# Patient Record
Sex: FEMALE | Race: WHITE | NOT HISPANIC OR LATINO | URBAN - METROPOLITAN AREA
[De-identification: names, ages, dates, MRNs, and addresses within clinical notes are randomized per-mention and may not be internally consistent; named-entity substitution may affect disease eponyms.]

---

## 2017-05-04 ENCOUNTER — FOLLOW UP ESTABLISHED (OUTPATIENT)
Dept: URBAN - METROPOLITAN AREA CLINIC 44 | Facility: CLINIC | Age: 77
End: 2017-05-04
Payer: MEDICARE

## 2017-05-04 DIAGNOSIS — H25.13 AGE-RELATED NUCLEAR CATARACT, BILATERAL: ICD-10-CM

## 2017-05-04 PROCEDURE — 92134 CPTRZ OPH DX IMG PST SGM RTA: CPT | Performed by: OPTOMETRIST

## 2017-05-04 PROCEDURE — 92015 DETERMINE REFRACTIVE STATE: CPT | Performed by: OPTOMETRIST

## 2017-05-04 PROCEDURE — 92014 COMPRE OPH EXAM EST PT 1/>: CPT | Performed by: OPTOMETRIST

## 2017-05-04 ASSESSMENT — KERATOMETRY
OD: 43.50
OS: 43.38

## 2017-05-04 ASSESSMENT — INTRAOCULAR PRESSURE
OD: 17
OS: 19

## 2017-05-04 ASSESSMENT — VISUAL ACUITY
OD: 20/25
OS: 20/20

## 2018-05-07 ENCOUNTER — FOLLOW UP ESTABLISHED (OUTPATIENT)
Dept: URBAN - METROPOLITAN AREA CLINIC 44 | Facility: CLINIC | Age: 78
End: 2018-05-07
Payer: MEDICARE

## 2018-05-07 DIAGNOSIS — H35.361 DRUSEN (DEGENERATIVE) OF MACULA, RIGHT EYE: Primary | ICD-10-CM

## 2018-05-07 PROCEDURE — 92014 COMPRE OPH EXAM EST PT 1/>: CPT | Performed by: OPTOMETRIST

## 2018-05-07 PROCEDURE — 92015 DETERMINE REFRACTIVE STATE: CPT | Performed by: OPTOMETRIST

## 2018-05-07 PROCEDURE — 92134 CPTRZ OPH DX IMG PST SGM RTA: CPT | Performed by: OPTOMETRIST

## 2018-05-07 ASSESSMENT — VISUAL ACUITY
OD: 20/25
OS: 20/30

## 2018-05-07 ASSESSMENT — INTRAOCULAR PRESSURE
OS: 16
OD: 15

## 2018-05-07 ASSESSMENT — KERATOMETRY
OD: 43.38
OS: 43.38

## 2020-10-27 ENCOUNTER — FOLLOW UP ESTABLISHED (OUTPATIENT)
Dept: URBAN - METROPOLITAN AREA CLINIC 44 | Facility: CLINIC | Age: 80
End: 2020-10-27
Payer: MEDICARE

## 2020-10-27 DIAGNOSIS — H25.813 COMBINED FORMS OF AGE-RELATED CATARACT, BILATERAL: Primary | ICD-10-CM

## 2020-10-27 PROCEDURE — 92014 COMPRE OPH EXAM EST PT 1/>: CPT | Performed by: OPTOMETRIST

## 2020-10-27 PROCEDURE — 92250 FUNDUS PHOTOGRAPHY W/I&R: CPT | Performed by: OPTOMETRIST

## 2020-10-27 PROCEDURE — 92134 CPTRZ OPH DX IMG PST SGM RTA: CPT | Performed by: OPTOMETRIST

## 2020-10-27 ASSESSMENT — KERATOMETRY
OS: 43.50
OD: 43.25

## 2020-10-27 ASSESSMENT — VISUAL ACUITY
OD: 20/25
OS: 20/30

## 2020-10-27 ASSESSMENT — INTRAOCULAR PRESSURE
OD: 18
OS: 18

## 2022-11-04 ENCOUNTER — INPATIENT (INPATIENT)
Facility: HOSPITAL | Age: 82
LOS: 6 days | Discharge: HOME | DRG: 439 | End: 2022-11-11
Attending: HOSPITALIST | Admitting: HOSPITALIST
Payer: MEDICARE

## 2022-11-04 VITALS
TEMPERATURE: 97 F | SYSTOLIC BLOOD PRESSURE: 146 MMHG | OXYGEN SATURATION: 99 % | HEART RATE: 90 BPM | DIASTOLIC BLOOD PRESSURE: 61 MMHG | RESPIRATION RATE: 17 BRPM

## 2022-11-04 DIAGNOSIS — Z95.2 PRESENCE OF PROSTHETIC HEART VALVE: Chronic | ICD-10-CM

## 2022-11-04 LAB
ALBUMIN SERPL ELPH-MCNC: 4.3 G/DL — SIGNIFICANT CHANGE UP (ref 3.3–5.2)
ALP SERPL-CCNC: 232 U/L — HIGH (ref 40–120)
ALT FLD-CCNC: 578 U/L — HIGH
ANION GAP SERPL CALC-SCNC: 11 MMOL/L — SIGNIFICANT CHANGE UP (ref 5–17)
APTT BLD: 25.9 SEC — LOW (ref 27.5–35.5)
AST SERPL-CCNC: 1105 U/L — HIGH
BASOPHILS # BLD AUTO: 0.03 K/UL — SIGNIFICANT CHANGE UP (ref 0–0.2)
BASOPHILS NFR BLD AUTO: 0.3 % — SIGNIFICANT CHANGE UP (ref 0–2)
BILIRUB SERPL-MCNC: 0.6 MG/DL — SIGNIFICANT CHANGE UP (ref 0.4–2)
BLD GP AB SCN SERPL QL: SIGNIFICANT CHANGE UP
BUN SERPL-MCNC: 20.7 MG/DL — HIGH (ref 8–20)
CALCIUM SERPL-MCNC: 9.9 MG/DL — SIGNIFICANT CHANGE UP (ref 8.4–10.5)
CHLORIDE SERPL-SCNC: 98 MMOL/L — SIGNIFICANT CHANGE UP (ref 96–108)
CO2 SERPL-SCNC: 25 MMOL/L — SIGNIFICANT CHANGE UP (ref 22–29)
CREAT SERPL-MCNC: 0.82 MG/DL — SIGNIFICANT CHANGE UP (ref 0.5–1.3)
EGFR: 72 ML/MIN/1.73M2 — SIGNIFICANT CHANGE UP
EOSINOPHIL # BLD AUTO: 0.02 K/UL — SIGNIFICANT CHANGE UP (ref 0–0.5)
EOSINOPHIL NFR BLD AUTO: 0.2 % — SIGNIFICANT CHANGE UP (ref 0–6)
FLUAV AG NPH QL: SIGNIFICANT CHANGE UP
FLUBV AG NPH QL: SIGNIFICANT CHANGE UP
GLUCOSE SERPL-MCNC: 147 MG/DL — HIGH (ref 70–99)
HCT VFR BLD CALC: 38.9 % — SIGNIFICANT CHANGE UP (ref 34.5–45)
HGB BLD-MCNC: 12.8 G/DL — SIGNIFICANT CHANGE UP (ref 11.5–15.5)
IMM GRANULOCYTES NFR BLD AUTO: 0.3 % — SIGNIFICANT CHANGE UP (ref 0–0.9)
INR BLD: 1.05 RATIO — SIGNIFICANT CHANGE UP (ref 0.88–1.16)
LACTATE BLDV-MCNC: 1.4 MMOL/L — SIGNIFICANT CHANGE UP (ref 0.5–2)
LIDOCAIN IGE QN: >3000 U/L — HIGH (ref 22–51)
LYMPHOCYTES # BLD AUTO: 0.6 K/UL — LOW (ref 1–3.3)
LYMPHOCYTES # BLD AUTO: 6.7 % — LOW (ref 13–44)
MCHC RBC-ENTMCNC: 29.6 PG — SIGNIFICANT CHANGE UP (ref 27–34)
MCHC RBC-ENTMCNC: 32.9 GM/DL — SIGNIFICANT CHANGE UP (ref 32–36)
MCV RBC AUTO: 89.8 FL — SIGNIFICANT CHANGE UP (ref 80–100)
MONOCYTES # BLD AUTO: 0.22 K/UL — SIGNIFICANT CHANGE UP (ref 0–0.9)
MONOCYTES NFR BLD AUTO: 2.5 % — SIGNIFICANT CHANGE UP (ref 2–14)
NEUTROPHILS # BLD AUTO: 8.07 K/UL — HIGH (ref 1.8–7.4)
NEUTROPHILS NFR BLD AUTO: 90 % — HIGH (ref 43–77)
PLATELET # BLD AUTO: 195 K/UL — SIGNIFICANT CHANGE UP (ref 150–400)
POTASSIUM SERPL-MCNC: 4.3 MMOL/L — SIGNIFICANT CHANGE UP (ref 3.5–5.3)
POTASSIUM SERPL-SCNC: 4.3 MMOL/L — SIGNIFICANT CHANGE UP (ref 3.5–5.3)
PROT SERPL-MCNC: 6.9 G/DL — SIGNIFICANT CHANGE UP (ref 6.6–8.7)
PROTHROM AB SERPL-ACNC: 12.2 SEC — SIGNIFICANT CHANGE UP (ref 10.5–13.4)
RBC # BLD: 4.33 M/UL — SIGNIFICANT CHANGE UP (ref 3.8–5.2)
RBC # FLD: 13.2 % — SIGNIFICANT CHANGE UP (ref 10.3–14.5)
RSV RNA NPH QL NAA+NON-PROBE: SIGNIFICANT CHANGE UP
SARS-COV-2 RNA SPEC QL NAA+PROBE: SIGNIFICANT CHANGE UP
SODIUM SERPL-SCNC: 134 MMOL/L — LOW (ref 135–145)
TROPONIN T SERPL-MCNC: <0.01 NG/ML — SIGNIFICANT CHANGE UP (ref 0–0.06)
WBC # BLD: 8.97 K/UL — SIGNIFICANT CHANGE UP (ref 3.8–10.5)
WBC # FLD AUTO: 8.97 K/UL — SIGNIFICANT CHANGE UP (ref 3.8–10.5)

## 2022-11-04 PROCEDURE — 99285 EMERGENCY DEPT VISIT HI MDM: CPT

## 2022-11-04 PROCEDURE — 93010 ELECTROCARDIOGRAM REPORT: CPT

## 2022-11-04 PROCEDURE — 71045 X-RAY EXAM CHEST 1 VIEW: CPT | Mod: 26

## 2022-11-04 PROCEDURE — 76705 ECHO EXAM OF ABDOMEN: CPT | Mod: 26

## 2022-11-04 PROCEDURE — 74177 CT ABD & PELVIS W/CONTRAST: CPT | Mod: 26,MA

## 2022-11-04 RX ORDER — SODIUM CHLORIDE 9 MG/ML
1000 INJECTION INTRAMUSCULAR; INTRAVENOUS; SUBCUTANEOUS ONCE
Refills: 0 | Status: COMPLETED | OUTPATIENT
Start: 2022-11-04 | End: 2022-11-04

## 2022-11-04 RX ORDER — ONDANSETRON 8 MG/1
4 TABLET, FILM COATED ORAL ONCE
Refills: 0 | Status: COMPLETED | OUTPATIENT
Start: 2022-11-04 | End: 2022-11-04

## 2022-11-04 RX ADMIN — SODIUM CHLORIDE 1000 MILLILITER(S): 9 INJECTION INTRAMUSCULAR; INTRAVENOUS; SUBCUTANEOUS at 15:15

## 2022-11-04 NOTE — ED ADULT TRIAGE NOTE - CHIEF COMPLAINT QUOTE
pt c/o upper abdominal pain.  as per pts sister, at time of pt c/o pain she was weak, looked like she was going to fall over and was cool and clammy.  respirations even and unlabored at this time, skin warm and dry.

## 2022-11-04 NOTE — ED PROVIDER NOTE - ATTENDING CONTRIBUTION TO CARE
Patient seen with resident.  AYUSH.  PMH of dementia, CAD, PPM, HTN ,HLD.  Here with sister.  Patient had brief episode of epigastric pain with nausea and diaphoresis that resolved spontaneously.  Occurred ~1-2 hours PTA.  ECG without ischemic changes.  Non toxic.  Well appearing.  No aggravating or relieving factors. No other pertinent PMH.  No other pertinent PSH.  No other pertinent FHx.  No fever/chills, No photophobia/eye pain/changes in vision, No ear pain/sore throat/dysphagia, No chest pain/palpitations, no SOB/cough/wheeze/stridor, no dysuria/frequency/discharge, No neck/back pain, no rash, no changes in neurological status/function.     Gen: Alert, NAD  Head: NC, AT, PERRL, EOMI, normal lids/conjunctiva  ENT: normal hearing, patent oropharynx without erythema/exudate, uvula midline  Neck: +supple, no tenderness/meningismus/JVD, +Trachea midline  Pulm: Bilateral BS, normal resp effort, no wheeze/stridor/retractions  CV: RRR, no R/G, +dist pulses  Abd: soft, NT/ND, +BS, no hepatosplenomegaly  Mskel: no edema/erythema/cyanosis  Skin: no rash  Neuro: AAOx3, no gross sensory/motor deficits,     Patient with epigastric pain and diaphoresis now resolved.  no Abd TTP on exam.  Lab values with normal initial trop but lipase >3000 with elevated LFT. Patient seen with resident.  SANDYS.  PMH of dementia, CAD, PPM, HTN ,HLD.  Here with sister.  Patient had brief episode of epigastric pain with nausea and diaphoresis that resolved spontaneously.  Occurred ~1-2 hours PTA.  ECG without ischemic changes.  Non toxic.  Well appearing.  No aggravating or relieving factors. No other pertinent PMH.  No other pertinent PSH.  No other pertinent FHx.  No fever/chills, No photophobia/eye pain/changes in vision, No ear pain/sore throat/dysphagia, No chest pain/palpitations, no SOB/cough/wheeze/stridor, no dysuria/frequency/discharge, No neck/back pain, no rash, no changes in neurological status/function.     Gen: Alert, NAD  Head: NC, AT, PERRL, EOMI, normal lids/conjunctiva  ENT: normal hearing, patent oropharynx without erythema/exudate, uvula midline  Neck: +supple, no tenderness/meningismus/JVD, +Trachea midline  Pulm: Bilateral BS, normal resp effort, no wheeze/stridor/retractions  CV: RRR, no R/G, +dist pulses  Abd: soft, NT/ND, +BS, no hepatosplenomegaly  Mskel: no edema/erythema/cyanosis  Skin: no rash  Neuro: AAOx3, no gross sensory/motor deficits,     Patient with epigastric pain and diaphoresis now resolved.  no Abd TTP on exam.  Lab values with normal initial trop but lipase >3000 with elevated LFT.  Patient pending CT scan.  Patient signed out to incoming physician.  All decisions regarding the progression of care will be made at their discretion.

## 2022-11-04 NOTE — ED PROVIDER NOTE - OBJECTIVE STATEMENT
80 y/o F with PMHx Alzheimer's, aortic Valve replacement, pacemaker placement ([facility] 2016), HTN, HLD, who presents to the ED for abdominal pain. Sister at bedside. States that they ate lunch and were waiting for a boat to take them to [place] when she started to experience sudden onset epigastric abdominal pain in addition to lightheadedness. Denies syncope. Sister states that she looked cool and clammy. States that her symptoms have mostly resolved now and feels close to her baseline. Denies any chest pain, shortness of breath, nausea, vomiting, diarrhea, or dysuria. States that her Cardiologist is Dr. Nair, last seen one month ago and denies any cardiac concerns at that time. Spoke to son over sister's phone, who lives with patient, states that she has a recent history of constipation, but has been having regular BMs recently. States that she appeared to be more fatigued yesterday. Denies any other complaints at this time.

## 2022-11-04 NOTE — ED ADULT NURSE NOTE - OBJECTIVE STATEMENT
Patient is alert and oriented X4 from home and comes in c/o upper abdominal pain. As per patients sister, at time of pt c/o pain she was weak, looked like she was going to fall over and was cool and clammy. Patient with no c/o pain at this time patient is resting in bed at this time with no signs or symptoms of distress. Patient is alert and oriented X2 from home and comes in c/o upper abdominal pain. As per patients sister, at time of pt c/o pain she was weak, looked like she was going to fall over and was cool and clammy. Patient with no c/o pain at this time patient is resting in bed at this time with no signs or symptoms of distress.

## 2022-11-04 NOTE — ED ADULT NURSE REASSESSMENT NOTE - NS ED NURSE REASSESS COMMENT FT1
assumed care from MALORIE Cordon. pt resting comfortably in stretcher. AOx2, pleasant.  reorientation provided as needed. NAD at this time.  placed on CM.

## 2022-11-04 NOTE — ED PROVIDER NOTE - PROGRESS NOTE DETAILS
Harshad: Pt received in signout from Dr. Mccoy. Pt in no acute distress. Results discussed with family. Will admit.

## 2022-11-04 NOTE — ED PROVIDER NOTE - CLINICAL SUMMARY MEDICAL DECISION MAKING FREE TEXT BOX
82 y/o F with PMHx Alzheimer's, aortic Valve replacement, pacemaker placement ([facility] 2016), HTN, HLD, who presents to the ED for abdominal pain. Basic labs, trop, ekg, cxr, lipase, lactate, reassess.

## 2022-11-04 NOTE — ED ADULT NURSE NOTE - NS ED NURSE RECORD ANOTHER HT AND WT
Patient walked pre and post high volume LP. No change in gait. Recommend rehab for parkinsons disease Yes

## 2022-11-04 NOTE — ED PROVIDER NOTE - PHYSICAL EXAMINATION
General: NAD  Head: NC, AT  EENT: no scleral icterus  Cardiac: RRR, no apparent murmurs, no lower extremity edema  Respiratory: CTABL, no respiratory distress   Abdomen: soft, ND, NT, nonperitonitic  MSK/Vascular: soft compartments, warm extremities  Neuro: sensation to light touch intact  Psych: calm, cooperative

## 2022-11-05 DIAGNOSIS — R74.01 ELEVATION OF LEVELS OF LIVER TRANSAMINASE LEVELS: ICD-10-CM

## 2022-11-05 DIAGNOSIS — K85.90 ACUTE PANCREATITIS WITHOUT NECROSIS OR INFECTION, UNSPECIFIED: ICD-10-CM

## 2022-11-05 DIAGNOSIS — K59.00 CONSTIPATION, UNSPECIFIED: ICD-10-CM

## 2022-11-05 LAB
ALBUMIN SERPL ELPH-MCNC: 4 G/DL — SIGNIFICANT CHANGE UP (ref 3.3–5.2)
ALP SERPL-CCNC: 370 U/L — HIGH (ref 40–120)
ALT FLD-CCNC: 3477 U/L — HIGH
ANION GAP SERPL CALC-SCNC: 9 MMOL/L — SIGNIFICANT CHANGE UP (ref 5–17)
AST SERPL-CCNC: 4034 U/L — HIGH
BASOPHILS # BLD AUTO: 0.03 K/UL — SIGNIFICANT CHANGE UP (ref 0–0.2)
BASOPHILS NFR BLD AUTO: 0.3 % — SIGNIFICANT CHANGE UP (ref 0–2)
BILIRUB SERPL-MCNC: 1.8 MG/DL — SIGNIFICANT CHANGE UP (ref 0.4–2)
BUN SERPL-MCNC: 14.6 MG/DL — SIGNIFICANT CHANGE UP (ref 8–20)
CALCIUM SERPL-MCNC: 9.9 MG/DL — SIGNIFICANT CHANGE UP (ref 8.4–10.5)
CHLORIDE SERPL-SCNC: 103 MMOL/L — SIGNIFICANT CHANGE UP (ref 96–108)
CO2 SERPL-SCNC: 27 MMOL/L — SIGNIFICANT CHANGE UP (ref 22–29)
CREAT SERPL-MCNC: 0.78 MG/DL — SIGNIFICANT CHANGE UP (ref 0.5–1.3)
EGFR: 77 ML/MIN/1.73M2 — SIGNIFICANT CHANGE UP
EOSINOPHIL # BLD AUTO: 0.04 K/UL — SIGNIFICANT CHANGE UP (ref 0–0.5)
EOSINOPHIL NFR BLD AUTO: 0.4 % — SIGNIFICANT CHANGE UP (ref 0–6)
GLUCOSE SERPL-MCNC: 120 MG/DL — HIGH (ref 70–99)
HCT VFR BLD CALC: 33.6 % — LOW (ref 34.5–45)
HGB BLD-MCNC: 11.4 G/DL — LOW (ref 11.5–15.5)
IMM GRANULOCYTES NFR BLD AUTO: 0.4 % — SIGNIFICANT CHANGE UP (ref 0–0.9)
LIDOCAIN IGE QN: >3000 U/L — HIGH (ref 22–51)
LYMPHOCYTES # BLD AUTO: 0.65 K/UL — LOW (ref 1–3.3)
LYMPHOCYTES # BLD AUTO: 5.9 % — LOW (ref 13–44)
MAGNESIUM SERPL-MCNC: 2.3 MG/DL — SIGNIFICANT CHANGE UP (ref 1.6–2.6)
MCHC RBC-ENTMCNC: 30.2 PG — SIGNIFICANT CHANGE UP (ref 27–34)
MCHC RBC-ENTMCNC: 33.9 GM/DL — SIGNIFICANT CHANGE UP (ref 32–36)
MCV RBC AUTO: 88.9 FL — SIGNIFICANT CHANGE UP (ref 80–100)
MONOCYTES # BLD AUTO: 0.76 K/UL — SIGNIFICANT CHANGE UP (ref 0–0.9)
MONOCYTES NFR BLD AUTO: 6.9 % — SIGNIFICANT CHANGE UP (ref 2–14)
NEUTROPHILS # BLD AUTO: 9.46 K/UL — HIGH (ref 1.8–7.4)
NEUTROPHILS NFR BLD AUTO: 86.1 % — HIGH (ref 43–77)
PHOSPHATE SERPL-MCNC: 3.2 MG/DL — SIGNIFICANT CHANGE UP (ref 2.4–4.7)
PLATELET # BLD AUTO: 163 K/UL — SIGNIFICANT CHANGE UP (ref 150–400)
POTASSIUM SERPL-MCNC: 3.9 MMOL/L — SIGNIFICANT CHANGE UP (ref 3.5–5.3)
POTASSIUM SERPL-SCNC: 3.9 MMOL/L — SIGNIFICANT CHANGE UP (ref 3.5–5.3)
PROT SERPL-MCNC: 6.4 G/DL — LOW (ref 6.6–8.7)
RBC # BLD: 3.78 M/UL — LOW (ref 3.8–5.2)
RBC # FLD: 13.7 % — SIGNIFICANT CHANGE UP (ref 10.3–14.5)
SODIUM SERPL-SCNC: 139 MMOL/L — SIGNIFICANT CHANGE UP (ref 135–145)
TRIGL SERPL-MCNC: 32 MG/DL — SIGNIFICANT CHANGE UP
WBC # BLD: 10.98 K/UL — HIGH (ref 3.8–10.5)
WBC # FLD AUTO: 10.98 K/UL — HIGH (ref 3.8–10.5)

## 2022-11-05 PROCEDURE — 99223 1ST HOSP IP/OBS HIGH 75: CPT

## 2022-11-05 RX ORDER — ONDANSETRON 8 MG/1
4 TABLET, FILM COATED ORAL EVERY 8 HOURS
Refills: 0 | Status: DISCONTINUED | OUTPATIENT
Start: 2022-11-05 | End: 2022-11-11

## 2022-11-05 RX ORDER — PANTOPRAZOLE SODIUM 20 MG/1
40 TABLET, DELAYED RELEASE ORAL
Refills: 0 | Status: DISCONTINUED | OUTPATIENT
Start: 2022-11-05 | End: 2022-11-11

## 2022-11-05 RX ORDER — MINERAL OIL
133 OIL (ML) MISCELLANEOUS
Refills: 0 | Status: DISCONTINUED | OUTPATIENT
Start: 2022-11-05 | End: 2022-11-11

## 2022-11-05 RX ORDER — PIPERACILLIN AND TAZOBACTAM 4; .5 G/20ML; G/20ML
3.38 INJECTION, POWDER, LYOPHILIZED, FOR SOLUTION INTRAVENOUS ONCE
Refills: 0 | Status: COMPLETED | OUTPATIENT
Start: 2022-11-05 | End: 2022-11-05

## 2022-11-05 RX ORDER — SENNA PLUS 8.6 MG/1
2 TABLET ORAL DAILY
Refills: 0 | Status: DISCONTINUED | OUTPATIENT
Start: 2022-11-05 | End: 2022-11-11

## 2022-11-05 RX ORDER — MEMANTINE HYDROCHLORIDE 10 MG/1
10 TABLET ORAL
Refills: 0 | Status: DISCONTINUED | OUTPATIENT
Start: 2022-11-05 | End: 2022-11-11

## 2022-11-05 RX ORDER — SODIUM CHLORIDE 9 MG/ML
1000 INJECTION, SOLUTION INTRAVENOUS
Refills: 0 | Status: DISCONTINUED | OUTPATIENT
Start: 2022-11-05 | End: 2022-11-07

## 2022-11-05 RX ORDER — ASPIRIN/CALCIUM CARB/MAGNESIUM 324 MG
81 TABLET ORAL DAILY
Refills: 0 | Status: DISCONTINUED | OUTPATIENT
Start: 2022-11-05 | End: 2022-11-11

## 2022-11-05 RX ORDER — ACETAMINOPHEN 500 MG
650 TABLET ORAL EVERY 6 HOURS
Refills: 0 | Status: DISCONTINUED | OUTPATIENT
Start: 2022-11-05 | End: 2022-11-11

## 2022-11-05 RX ORDER — LANOLIN ALCOHOL/MO/W.PET/CERES
3 CREAM (GRAM) TOPICAL AT BEDTIME
Refills: 0 | Status: DISCONTINUED | OUTPATIENT
Start: 2022-11-05 | End: 2022-11-11

## 2022-11-05 RX ORDER — SERTRALINE 25 MG/1
25 TABLET, FILM COATED ORAL DAILY
Refills: 0 | Status: DISCONTINUED | OUTPATIENT
Start: 2022-11-05 | End: 2022-11-11

## 2022-11-05 RX ORDER — FERROUS SULFATE 325(65) MG
325 TABLET ORAL DAILY
Refills: 0 | Status: DISCONTINUED | OUTPATIENT
Start: 2022-11-05 | End: 2022-11-11

## 2022-11-05 RX ORDER — METOPROLOL TARTRATE 50 MG
25 TABLET ORAL THREE TIMES A DAY
Refills: 0 | Status: DISCONTINUED | OUTPATIENT
Start: 2022-11-05 | End: 2022-11-11

## 2022-11-05 RX ORDER — ENOXAPARIN SODIUM 100 MG/ML
40 INJECTION SUBCUTANEOUS EVERY 24 HOURS
Refills: 0 | Status: DISCONTINUED | OUTPATIENT
Start: 2022-11-05 | End: 2022-11-11

## 2022-11-05 RX ORDER — MESALAMINE 400 MG
500 TABLET, DELAYED RELEASE (ENTERIC COATED) ORAL
Refills: 0 | Status: DISCONTINUED | OUTPATIENT
Start: 2022-11-05 | End: 2022-11-11

## 2022-11-05 RX ORDER — ASCORBIC ACID 60 MG
500 TABLET,CHEWABLE ORAL DAILY
Refills: 0 | Status: DISCONTINUED | OUTPATIENT
Start: 2022-11-05 | End: 2022-11-11

## 2022-11-05 RX ORDER — MIRABEGRON 50 MG/1
25 TABLET, EXTENDED RELEASE ORAL
Refills: 0 | Status: DISCONTINUED | OUTPATIENT
Start: 2022-11-05 | End: 2022-11-11

## 2022-11-05 RX ORDER — ATORVASTATIN CALCIUM 80 MG/1
40 TABLET, FILM COATED ORAL AT BEDTIME
Refills: 0 | Status: DISCONTINUED | OUTPATIENT
Start: 2022-11-05 | End: 2022-11-08

## 2022-11-05 RX ORDER — SODIUM CHLORIDE 9 MG/ML
1000 INJECTION INTRAMUSCULAR; INTRAVENOUS; SUBCUTANEOUS
Refills: 0 | Status: DISCONTINUED | OUTPATIENT
Start: 2022-11-05 | End: 2022-11-05

## 2022-11-05 RX ORDER — POLYETHYLENE GLYCOL 3350 17 G/17G
17 POWDER, FOR SOLUTION ORAL DAILY
Refills: 0 | Status: DISCONTINUED | OUTPATIENT
Start: 2022-11-05 | End: 2022-11-05

## 2022-11-05 RX ORDER — POLYETHYLENE GLYCOL 3350 17 G/17G
17 POWDER, FOR SOLUTION ORAL
Refills: 0 | Status: DISCONTINUED | OUTPATIENT
Start: 2022-11-05 | End: 2022-11-11

## 2022-11-05 RX ORDER — PIPERACILLIN AND TAZOBACTAM 4; .5 G/20ML; G/20ML
3.38 INJECTION, POWDER, LYOPHILIZED, FOR SOLUTION INTRAVENOUS EVERY 8 HOURS
Refills: 0 | Status: DISCONTINUED | OUTPATIENT
Start: 2022-11-05 | End: 2022-11-07

## 2022-11-05 RX ADMIN — PANTOPRAZOLE SODIUM 40 MILLIGRAM(S): 20 TABLET, DELAYED RELEASE ORAL at 06:05

## 2022-11-05 RX ADMIN — MEMANTINE HYDROCHLORIDE 10 MILLIGRAM(S): 10 TABLET ORAL at 18:06

## 2022-11-05 RX ADMIN — SERTRALINE 25 MILLIGRAM(S): 25 TABLET, FILM COATED ORAL at 11:46

## 2022-11-05 RX ADMIN — Medication 25 MILLIGRAM(S): at 21:34

## 2022-11-05 RX ADMIN — Medication 25 MILLIGRAM(S): at 14:51

## 2022-11-05 RX ADMIN — POLYETHYLENE GLYCOL 3350 17 GRAM(S): 17 POWDER, FOR SOLUTION ORAL at 18:06

## 2022-11-05 RX ADMIN — POLYETHYLENE GLYCOL 3350 17 GRAM(S): 17 POWDER, FOR SOLUTION ORAL at 11:46

## 2022-11-05 RX ADMIN — SODIUM CHLORIDE 75 MILLILITER(S): 9 INJECTION INTRAMUSCULAR; INTRAVENOUS; SUBCUTANEOUS at 03:54

## 2022-11-05 RX ADMIN — SENNA PLUS 2 TABLET(S): 8.6 TABLET ORAL at 11:46

## 2022-11-05 RX ADMIN — Medication 500 MILLIGRAM(S): at 18:06

## 2022-11-05 RX ADMIN — SODIUM CHLORIDE 125 MILLILITER(S): 9 INJECTION, SOLUTION INTRAVENOUS at 14:55

## 2022-11-05 RX ADMIN — ATORVASTATIN CALCIUM 40 MILLIGRAM(S): 80 TABLET, FILM COATED ORAL at 21:34

## 2022-11-05 RX ADMIN — PIPERACILLIN AND TAZOBACTAM 25 GRAM(S): 4; .5 INJECTION, POWDER, LYOPHILIZED, FOR SOLUTION INTRAVENOUS at 16:39

## 2022-11-05 RX ADMIN — Medication 133 MILLILITER(S): at 18:06

## 2022-11-05 RX ADMIN — Medication 500 MILLIGRAM(S): at 18:05

## 2022-11-05 RX ADMIN — Medication 25 MILLIGRAM(S): at 06:06

## 2022-11-05 RX ADMIN — MIRABEGRON 25 MILLIGRAM(S): 50 TABLET, EXTENDED RELEASE ORAL at 18:06

## 2022-11-05 RX ADMIN — ENOXAPARIN SODIUM 40 MILLIGRAM(S): 100 INJECTION SUBCUTANEOUS at 11:46

## 2022-11-05 RX ADMIN — Medication 500 MILLIGRAM(S): at 06:06

## 2022-11-05 RX ADMIN — PIPERACILLIN AND TAZOBACTAM 200 GRAM(S): 4; .5 INJECTION, POWDER, LYOPHILIZED, FOR SOLUTION INTRAVENOUS at 06:05

## 2022-11-05 RX ADMIN — Medication 325 MILLIGRAM(S): at 18:05

## 2022-11-05 RX ADMIN — Medication 81 MILLIGRAM(S): at 18:05

## 2022-11-05 RX ADMIN — PIPERACILLIN AND TAZOBACTAM 25 GRAM(S): 4; .5 INJECTION, POWDER, LYOPHILIZED, FOR SOLUTION INTRAVENOUS at 10:15

## 2022-11-05 RX ADMIN — SODIUM CHLORIDE 125 MILLILITER(S): 9 INJECTION, SOLUTION INTRAVENOUS at 06:02

## 2022-11-05 NOTE — CONSULT NOTE ADULT - PROBLEM SELECTOR RECOMMENDATION 9
Viral hepatitis vs shock liver 2/2 ischemic event vs CBD stone/sludge   Will order viral hepatitis serologies and autoimmune workup  If LFTs remain elevated, would consider EUS/ERCP next week but would need cardiac clearance  If LFTs return to normal range, likely 2/2 ischemic event Viral hepatitis vs shock liver 2/2 ischemic event vs CBD stone/sludge   Will order viral hepatitis serologies and autoimmune workup  If LFTs remain elevated, can  consider EUS next week but would need cardiac clearance ( unable to do MRCP due to PPM)   If LFTs return to normal range, likely 2/2 ischemic event

## 2022-11-05 NOTE — PATIENT PROFILE ADULT - FALL HARM RISK - HARM RISK INTERVENTIONS

## 2022-11-05 NOTE — CONSULT NOTE ADULT - SUBJECTIVE AND OBJECTIVE BOX
Patient is a 81y old  Female who presents with a chief complaint of Pancreatitis v Constipation/Stercoral Colitis (05 Nov 2022 04:40)      HPI:  81F with PMHX Alzheimer's Dementia, Aortic Valve replacement, PPM ([facility] 2016), MDD, HTN, HLD, Ulcerative Colitis, Chronic Constipation, GERD presents to [facility] ER c/o acute onset epigastric abdominal pain which started after eating large Pancake breakfast yesterday. Abd pain resolved after episode of vomiting in ED, vomitus consisted of previously digested food. No current abdominal pain or further episodes of N/V.     Pt seen and examined at bedside, sitting upright in chair. She is seen and examined with her sister at bedside. Pt has a chronic h/o constipation, BMs Q4D with the use of Miralax and Colace daily. Admits to straining with BMs. She used a tap water enema in the past with no relief. She has an outpatient gastroenterologist, Dr. Martinez ([phone]). Pt lives in [place], [place] with her son.     In ED, RUQ US shows dilated CBD. CT imaging with constipation/stercoral colitis. LFTs elevated Alk Phos 370, AST 4034, ALT 3477, Lipase >3000, bilirubin normal.           REVIEW OF SYSTEMS:  Constitutional: No fever, weight loss or fatigue  ENMT:  No difficulty hearing, tinnitus, vertigo; No sinus or throat pain  Respiratory: No cough, wheezing, chills or hemoptysis  Cardiovascular: No chest pain, palpitations, dizziness or leg swelling  Gastrointestinal: see HPI   Skin: No itching, burning, rashes or lesions   Musculoskeletal: No joint pain or swelling; No muscle, back or extremity pain    PAST MEDICAL & SURGICAL HISTORY:  Cardiac pacemaker      HTN (hypertension)      HLD (hyperlipidemia)      S/P aortic valve replacement          FAMILY HISTORY:      SOCIAL HISTORY:  Smoking Status: [ ] Current, [ ] Former, [ ] Never  Pack Years:  [  ] EtOH  [  ] IVDA    MEDICATIONS:  MEDICATIONS  (STANDING):  atorvastatin 40 milliGRAM(s) Oral at bedtime  enoxaparin Injectable 40 milliGRAM(s) SubCutaneous every 24 hours  lactated ringers. 1000 milliLiter(s) (125 mL/Hr) IV Continuous <Continuous>  mesalamine ER Capsule 500 milliGRAM(s) Oral two times a day  metoprolol tartrate 25 milliGRAM(s) Oral three times a day  pantoprazole    Tablet 40 milliGRAM(s) Oral before breakfast  piperacillin/tazobactam IVPB.. 3.375 Gram(s) IV Intermittent every 8 hours  polyethylene glycol 3350 17 Gram(s) Oral daily  senna 2 Tablet(s) Oral daily  sertraline 25 milliGRAM(s) Oral daily    MEDICATIONS  (PRN):  acetaminophen     Tablet .. 650 milliGRAM(s) Oral every 6 hours PRN Temp greater or equal to 38C (100.4F), Mild Pain (1 - 3)  aluminum hydroxide/magnesium hydroxide/simethicone Suspension 30 milliLiter(s) Oral every 4 hours PRN Dyspepsia  melatonin 3 milliGRAM(s) Oral at bedtime PRN Insomnia  ondansetron Injectable 4 milliGRAM(s) IV Push every 8 hours PRN Nausea and/or Vomiting      Allergies    No Known Allergies    Intolerances        Vital Signs Last 24 Hrs  T(C): 36.9 (05 Nov 2022 11:42), Max: 36.9 (04 Nov 2022 23:39)  T(F): 98.4 (05 Nov 2022 11:42), Max: 98.5 (04 Nov 2022 23:39)  HR: 64 (05 Nov 2022 11:42) (64 - 70)  BP: 176/74 (05 Nov 2022 11:42) (143/66 - 198/80)  BP(mean): 107 (05 Nov 2022 05:41) (107 - 107)  RR: 18 (05 Nov 2022 11:42) (15 - 18)  SpO2: 99% (05 Nov 2022 11:42) (98% - 100%)    Parameters below as of 05 Nov 2022 11:42  Patient On (Oxygen Delivery Method): room air            PHYSICAL EXAM:    General: in no acute distress  HEENT: MMM, conjunctiva and sclera clear  Gastrointestinal: Soft, non-tender non-distended; Normal bowel sounds; No rebound or guarding. healed laparoscopic surgical scars but difficult to assess indication for previous surgery  Extremities: Normal range of motion, No clubbing, cyanosis or edema  Neurological: Alert and oriented x3  Skin: Warm and dry. No obvious rash      LABS:                        11.4   10.98 )-----------( 163      ( 05 Nov 2022 07:56 )             33.6       Lipase, Serum: >3000 U/L (11.05.22 @ 07:56)     Comprehensive Metabolic Panel (11.05.22 @ 07:56)   Sodium, Serum: 139 mmol/L   Potassium, Serum: 3.9 mmol/L   Chloride, Serum: 103: Chloride reference range changed from ..10/26/2022 mmol/L   Carbon Dioxide, Serum: 27.0 mmol/L   Anion Gap, Serum: 9 mmol/L   Blood Urea Nitrogen, Serum: 14.6 mg/dL   Creatinine, Serum: 0.78 mg/dL   Glucose, Serum: 120 mg/dL   Calcium, Total Serum: 9.9: Prior Reference Range of 8.6 – 10.2 was amended as of 7/26/2022 to 8.4 –   10.5. mg/dL   Protein Total, Serum: 6.4 g/dL   Albumin, Serum: 4.0 g/dL   Bilirubin Total, Serum: 1.8 mg/dL   Alkaline Phosphatase, Serum: 370 U/L   Aspartate Aminotransferase (AST/SGOT): 4034 U/L   Alanine Aminotransferase (ALT/SGPT): 3477 U/L   eGFR: 77      RADIOLOGY & ADDITIONAL STUDIES:       < from: CT Abdomen and Pelvis w/ IV Cont (11.04.22 @ 22:39) >    ACC: [id number] EXAM:  CT ABDOMEN AND PELVIS IC                          PROCEDURE DATE:  11/04/2022          INTERPRETATION:  CLINICAL INFORMATION: Epigastric tenderness to   palpation. Upper abdominal pain. As per pts sister, at time of pt c/o   pain she was weak, looked like she was going to fall over and was cool   and clammy. Respirations even and unlabored at this time, skin warm and   dry.    COMPARISON: Abdominal sonogram 11/4/2022    CONTRAST/COMPLICATIONS:  IV Contrast: Omnipaque 350  96 cc administered  Oral Contrast: NONE  Complications: None reported at time of study completion    PROCEDURE:  CT of the Abdomen and Pelvis was performed.  Sagittal and coronal reformats were performed.    FINDINGS:  LOWER CHEST: Focal atelectasis versus scarring in the right lower and   lingula of the left upper lobe.    LIVER: Within normal limits.  BILE DUCTS: Intra and extrahepatic biliary ductal patient which may   reflect postcholecystectomy state. No evidence of radiopaque common duct   stone. Calcific densities adjacent to the head of the pancreas.   GALLBLADDER: Cholecystectomy.  SPLEEN: Within normal limits.  PANCREAS: Within normal limits.  ADRENALS: Within normal limits.  KIDNEYS/URETERS: Within normal limits.    BLADDER: Within normal limits.  REPRODUCTIVE ORGANS: Hysterectomy.    BOWEL: No bowel obstruction. Moderately large fecal burden throughout the   colon with rectal distention. Mild rectal wall thickening and hazy   appearance to the perirectal fat. Redundant sigmoid colon. No evidence of   appendicitis.  PERITONEUM: No ascites. No free air or intraperitoneal collection.  VESSELS: Within normal limits.  RETROPERITONEUM/LYMPH NODES: No lymphadenopathy.  ABDOMINAL WALL: Within normal limits.  BONES: Degenerative changes. Borderline grade 2 anterolisthesis of L4 and   L5.    IMPRESSION:  Moderately large fecal burden. Distended rectum with mild wall thickening   and perirectal fat haziness suggesting early stercoral colitis.    --- End of Report ---            FREDRICK ANGELES MD; Attending Radiologist  This document has been electronically signed. Nov 5 2022 12:31AM    < end of copied text >      < from: US Abdomen Upper Quadrant Right (11.04.22 @ 17:44) >    ACC: [id number] EXAM:  US ABDOMEN RT UPR QUADRANT                          PROCEDURE DATE:  11/04/2022          INTERPRETATION:  CLINICAL INFORMATION: Epigastric pain    COMPARISON: None available.    TECHNIQUE: Sonography of the right upper quadrant.    FINDINGS:  Liver: Within normal limits.  Bile ducts: Common duct is dilated to 1.4 cm. Central intrahepatic   radicals moderately dilated.  Correlate with LFTs and MRCP  Gallbladder: Not visualized.? Cholecystectomy versus a completely   contracted gallbladder.  Pancreas: Visualized portions are within normal limits.  Right kidney: 9.2 cm. No hydronephrosis.  Ascites: None.  IVC: Visualized portions are within normal limits.    IMPRESSION:  Gallbladder not visualized.? Cholecystectomy versus completely contracted   gallbladder.     Choledochomegaly and intrahepatic biliary dilatation. Correlate with   LFTs and MRCP    --- End of Report ---            BRIDGETT TERRELL MD; Attending Radiologist  This document has been electronically signed. Nov 4 2022  5:58PM    < end of copied text >

## 2022-11-05 NOTE — H&P ADULT - ASSESSMENT
ASSESSMENT:  81F with PMHX Alzheimer's Dementia, Aortic Valve replacement, PPM ([facility] 2016), MDD, HTN, HLD, Ulcerative Colitis, Chronic Constipation, GERD presents to [facility] ER c/o acute onset epigastric abdominal pain with nausea/vomiting admitted for Acute Pancreatitis vs Severe Constipation/Stercoral Colitis.    PLAN:  Acute Pancreatitis vs Severe Constipation/Stercoral Colitis  -Admit to Medicine  -US and CT Imaging above  -Repeat Labs/Lipase  -No abdominal pain on exam without pain meds doubt Pancreatitis  -IVF Hydration NSS 75cc/hr  -NPO  -VTE PPX  -Zosyn 3.375g IV q8 for empiric abx coverage  -Advance Bowel Regimen with Miralax/Senna   -Defer MRCP/ERCP to GI  -GI Consulted    Alzheimer's Dementia  -Supportive Care  -Memantine 28mg q24     Ulcerative Colitis, GERD  -Pantoprazole 40mg q24  -Mesalamine 500mg BID    HTN, HLD, PPM   -Metoprolol Tartrate 25mg TID  -Atorvastatin 40mg q24    MDD  -Sertraline 25mg q24

## 2022-11-05 NOTE — CONSULT NOTE ADULT - NS ATTEND AMEND GEN_ALL_CORE FT
Patient seen and examined with PA  Sister was at bedside and answered questions  Patient was A +O X 3, but slow to answer questions  Patient with upper abdominal pain, diaphoresis, light headaches  after eating breakfast. One episode of bilious emesis. No fevers. Pain has now resolved. NO hx of jaundice. Does not recall lap robert.    ++ chronic constipation  . Has BM q 4 days. 2 weeks ago had to use tap water enema. Follows with GI in [place]. Does not recall last colonoscopy    In ER, transaminases in 3000- 4000.  Bilirubin normal. Alk phos moderately elevated, elevated Lipase  Ct showed dilated  CBD ( S/P robert ), normal liver, normal pancreas.       A/P  elevated transaminases- ?viral,  liver serologies , EBV,  ischemia    constipation- fecal retension on CT  miralax  bid  mineral oil enema bid

## 2022-11-05 NOTE — CONSULT NOTE ADULT - TIME BILLING
I reviewed the CT images, labs, notes   spoke to sister at bedside at [facility]  spoke to GI PA regarding plan  orders written

## 2022-11-05 NOTE — H&P ADULT - NSHPPHYSICALEXAM_GEN_ALL_CORE
Vital Signs Last 24 Hrs  T(C): 36.9 (04 Nov 2022 23:39), Max: 36.9 (04 Nov 2022 23:39)  T(F): 98.5 (04 Nov 2022 23:39), Max: 98.5 (04 Nov 2022 23:39)  HR: 65 (05 Nov 2022 03:52) (65 - 90)  BP: 198/80 (05 Nov 2022 03:52) (143/66 - 198/80)  BP(mean): --  RR: 15 (05 Nov 2022 03:52) (15 - 17)  SpO2: 100% (04 Nov 2022 22:50) (99% - 100%)    Parameters below as of 04 Nov 2022 22:50  Patient On (Oxygen Delivery Method): room air    Constitutional: Well-appearing, NAD, VSS  Head: NC/AT  Eyes: PERRL, EOMI, anicteric sclera, conjunctiva WNL  ENT: Normal Pharynx, MMM, No tonsillar exudate/erythema  Neck: Supple, Non-tender  Chest: Non-tender, no rashes  Cardio: RRR, s1/s2, no appreciable murmurs/rubs/gallops  Resp: BS CTA bilaterally, no wheezing/rhonchi/rales  Abd: Soft, Non-tender, Non-distended, no rebound/guarding/rigidity  : not examined  Rectal: not examined  MSK: moving all extremities, no motor weakness, full ROM x4  Ext: palpable distal pulses, good capillary refill, no clubbing/cyanosis/edema  Psych: appropriate, cooperative  Neuro: CN II-XII grossly intact, no focal deficits  Skin: Warm/Dry. No rashes.

## 2022-11-05 NOTE — CONSULT NOTE ADULT - ASSESSMENT
81F with PMHX Alzheimer's Dementia, Aortic Valve replacement, PPM ([facility] 2016), MDD, HTN, HLD, Ulcerative Colitis, Chronic Constipation, GERD presents to [facility] ER after an acute episode of abdominal pain and vomiting. Vomiting has since resolved and found to have dilated CBD on U/S, possible contracted gallbladder vs cholecystectomy and CT imaging with constipation/stercoral colitis and transaminitis.     LFTs elevated Alk Phos 370, AST 4034, ALT 3477, Lipase >3000, bilirubin normal.

## 2022-11-05 NOTE — H&P ADULT - HISTORY OF PRESENT ILLNESS
81F with PMHX Alzheimer's Dementia, Aortic Valve replacement, PPM ([facility] 2016), MDD, HTN, HLD, Ulcerative Colitis, Chronic Constipation, GERD presents to [facility] ER c/o acute onset epigastric abdominal pain which started after eating large Pancake breakfast. Abd pain resolved after episode of vomiting in ED. No current abdominal pain or further episodes of N/V. RUQ US shows dilated CBD. Transaminitis and elevated Lipase on labs. CT imaging with constipation/stercoral colitis. Pt seen/examined with sister at bedside providing HPI. Med list confirmed. No other complaints.     ROS negative unless mentioned.     PMHX: Alzheimer's Dementia, Aortic Valve replacement, PPM ([facility] 2016), HTN, HLD, Ulcerative Colitis, Chronic Constipation, GERD, MDD  PSHX: ?Cholecystectomy, PPM placement, AVR  FamHx: Denies fam hx HTN  Social Hx: Denies etoh/tobacco/drug abuse  NKDA

## 2022-11-05 NOTE — H&P ADULT - NSHPLABSRESULTS_GEN_ALL_CORE
RUQ ABD US IMPRESSION:  Gallbladder not visualized.? Cholecystectomy versus completely contracted   gallbladder.  Choledochomegaly and intrahepatic biliary dilatation. Correlate with   LFTs and MRCP    CT ABD PELVIS IVC IMPRESSION:  Moderately large fecal burden. Distended rectum with mild wall thickening   and perirectal fat haziness suggesting early stercoral colitis.

## 2022-11-06 PROBLEM — Z00.00 ENCOUNTER FOR PREVENTIVE HEALTH EXAMINATION: Status: ACTIVE | Noted: 2022-11-06

## 2022-11-06 LAB
ALBUMIN SERPL ELPH-MCNC: 4.1 G/DL — SIGNIFICANT CHANGE UP (ref 3.3–5.2)
ALP SERPL-CCNC: 353 U/L — HIGH (ref 40–120)
ALT FLD-CCNC: 2248 U/L — HIGH
ANION GAP SERPL CALC-SCNC: 13 MMOL/L — SIGNIFICANT CHANGE UP (ref 5–17)
AST SERPL-CCNC: 1594 U/L — HIGH
BASOPHILS # BLD AUTO: 0.04 K/UL — SIGNIFICANT CHANGE UP (ref 0–0.2)
BASOPHILS NFR BLD AUTO: 0.6 % — SIGNIFICANT CHANGE UP (ref 0–2)
BILIRUB SERPL-MCNC: 1.1 MG/DL — SIGNIFICANT CHANGE UP (ref 0.4–2)
BUN SERPL-MCNC: 10 MG/DL — SIGNIFICANT CHANGE UP (ref 8–20)
CALCIUM SERPL-MCNC: 10.2 MG/DL — SIGNIFICANT CHANGE UP (ref 8.4–10.5)
CHLORIDE SERPL-SCNC: 104 MMOL/L — SIGNIFICANT CHANGE UP (ref 96–108)
CO2 SERPL-SCNC: 23 MMOL/L — SIGNIFICANT CHANGE UP (ref 22–29)
CREAT SERPL-MCNC: 0.65 MG/DL — SIGNIFICANT CHANGE UP (ref 0.5–1.3)
EGFR: 89 ML/MIN/1.73M2 — SIGNIFICANT CHANGE UP
EOSINOPHIL # BLD AUTO: 0.18 K/UL — SIGNIFICANT CHANGE UP (ref 0–0.5)
EOSINOPHIL NFR BLD AUTO: 2.6 % — SIGNIFICANT CHANGE UP (ref 0–6)
GLUCOSE SERPL-MCNC: 103 MG/DL — HIGH (ref 70–99)
HCT VFR BLD CALC: 34.9 % — SIGNIFICANT CHANGE UP (ref 34.5–45)
HGB BLD-MCNC: 11.7 G/DL — SIGNIFICANT CHANGE UP (ref 11.5–15.5)
IMM GRANULOCYTES NFR BLD AUTO: 0.1 % — SIGNIFICANT CHANGE UP (ref 0–0.9)
LYMPHOCYTES # BLD AUTO: 1.08 K/UL — SIGNIFICANT CHANGE UP (ref 1–3.3)
LYMPHOCYTES # BLD AUTO: 15.5 % — SIGNIFICANT CHANGE UP (ref 13–44)
MCHC RBC-ENTMCNC: 29.7 PG — SIGNIFICANT CHANGE UP (ref 27–34)
MCHC RBC-ENTMCNC: 33.5 GM/DL — SIGNIFICANT CHANGE UP (ref 32–36)
MCV RBC AUTO: 88.6 FL — SIGNIFICANT CHANGE UP (ref 80–100)
MONOCYTES # BLD AUTO: 0.38 K/UL — SIGNIFICANT CHANGE UP (ref 0–0.9)
MONOCYTES NFR BLD AUTO: 5.4 % — SIGNIFICANT CHANGE UP (ref 2–14)
NEUTROPHILS # BLD AUTO: 5.3 K/UL — SIGNIFICANT CHANGE UP (ref 1.8–7.4)
NEUTROPHILS NFR BLD AUTO: 75.8 % — SIGNIFICANT CHANGE UP (ref 43–77)
PLATELET # BLD AUTO: 163 K/UL — SIGNIFICANT CHANGE UP (ref 150–400)
POTASSIUM SERPL-MCNC: 3.5 MMOL/L — SIGNIFICANT CHANGE UP (ref 3.5–5.3)
POTASSIUM SERPL-SCNC: 3.5 MMOL/L — SIGNIFICANT CHANGE UP (ref 3.5–5.3)
PROT SERPL-MCNC: 6.5 G/DL — LOW (ref 6.6–8.7)
RBC # BLD: 3.94 M/UL — SIGNIFICANT CHANGE UP (ref 3.8–5.2)
RBC # FLD: 13.8 % — SIGNIFICANT CHANGE UP (ref 10.3–14.5)
SODIUM SERPL-SCNC: 140 MMOL/L — SIGNIFICANT CHANGE UP (ref 135–145)
WBC # BLD: 6.99 K/UL — SIGNIFICANT CHANGE UP (ref 3.8–10.5)
WBC # FLD AUTO: 6.99 K/UL — SIGNIFICANT CHANGE UP (ref 3.8–10.5)

## 2022-11-06 PROCEDURE — 99233 SBSQ HOSP IP/OBS HIGH 50: CPT

## 2022-11-06 RX ORDER — OLANZAPINE 15 MG/1
5 TABLET, FILM COATED ORAL ONCE
Refills: 0 | Status: DISCONTINUED | OUTPATIENT
Start: 2022-11-06 | End: 2022-11-11

## 2022-11-06 RX ORDER — AMLODIPINE BESYLATE 2.5 MG/1
5 TABLET ORAL DAILY
Refills: 0 | Status: DISCONTINUED | OUTPATIENT
Start: 2022-11-06 | End: 2022-11-09

## 2022-11-06 RX ORDER — OLANZAPINE 15 MG/1
5 TABLET, FILM COATED ORAL ONCE
Refills: 0 | Status: COMPLETED | OUTPATIENT
Start: 2022-11-06 | End: 2022-11-06

## 2022-11-06 RX ADMIN — POLYETHYLENE GLYCOL 3350 17 GRAM(S): 17 POWDER, FOR SOLUTION ORAL at 05:40

## 2022-11-06 RX ADMIN — Medication 25 MILLIGRAM(S): at 21:31

## 2022-11-06 RX ADMIN — AMLODIPINE BESYLATE 5 MILLIGRAM(S): 2.5 TABLET ORAL at 08:01

## 2022-11-06 RX ADMIN — Medication 500 MILLIGRAM(S): at 11:24

## 2022-11-06 RX ADMIN — SODIUM CHLORIDE 125 MILLILITER(S): 9 INJECTION, SOLUTION INTRAVENOUS at 21:32

## 2022-11-06 RX ADMIN — MIRABEGRON 25 MILLIGRAM(S): 50 TABLET, EXTENDED RELEASE ORAL at 11:24

## 2022-11-06 RX ADMIN — SENNA PLUS 2 TABLET(S): 8.6 TABLET ORAL at 11:24

## 2022-11-06 RX ADMIN — PANTOPRAZOLE SODIUM 40 MILLIGRAM(S): 20 TABLET, DELAYED RELEASE ORAL at 05:40

## 2022-11-06 RX ADMIN — MEMANTINE HYDROCHLORIDE 10 MILLIGRAM(S): 10 TABLET ORAL at 05:39

## 2022-11-06 RX ADMIN — Medication 325 MILLIGRAM(S): at 11:24

## 2022-11-06 RX ADMIN — PIPERACILLIN AND TAZOBACTAM 25 GRAM(S): 4; .5 INJECTION, POWDER, LYOPHILIZED, FOR SOLUTION INTRAVENOUS at 18:18

## 2022-11-06 RX ADMIN — MEMANTINE HYDROCHLORIDE 10 MILLIGRAM(S): 10 TABLET ORAL at 18:10

## 2022-11-06 RX ADMIN — Medication 81 MILLIGRAM(S): at 11:24

## 2022-11-06 RX ADMIN — Medication 133 MILLILITER(S): at 18:09

## 2022-11-06 RX ADMIN — Medication 25 MILLIGRAM(S): at 05:39

## 2022-11-06 RX ADMIN — ATORVASTATIN CALCIUM 40 MILLIGRAM(S): 80 TABLET, FILM COATED ORAL at 21:31

## 2022-11-06 RX ADMIN — Medication 500 MILLIGRAM(S): at 18:10

## 2022-11-06 RX ADMIN — Medication 133 MILLILITER(S): at 06:44

## 2022-11-06 RX ADMIN — Medication 500 MILLIGRAM(S): at 05:39

## 2022-11-06 RX ADMIN — PIPERACILLIN AND TAZOBACTAM 25 GRAM(S): 4; .5 INJECTION, POWDER, LYOPHILIZED, FOR SOLUTION INTRAVENOUS at 09:11

## 2022-11-06 RX ADMIN — OLANZAPINE 5 MILLIGRAM(S): 15 TABLET, FILM COATED ORAL at 01:34

## 2022-11-06 RX ADMIN — PIPERACILLIN AND TAZOBACTAM 25 GRAM(S): 4; .5 INJECTION, POWDER, LYOPHILIZED, FOR SOLUTION INTRAVENOUS at 00:47

## 2022-11-06 RX ADMIN — ENOXAPARIN SODIUM 40 MILLIGRAM(S): 100 INJECTION SUBCUTANEOUS at 11:23

## 2022-11-06 RX ADMIN — SERTRALINE 25 MILLIGRAM(S): 25 TABLET, FILM COATED ORAL at 11:24

## 2022-11-06 RX ADMIN — POLYETHYLENE GLYCOL 3350 17 GRAM(S): 17 POWDER, FOR SOLUTION ORAL at 18:18

## 2022-11-06 NOTE — PROGRESS NOTE ADULT - PROBLEM SELECTOR PLAN 1
Michael due to shock liver. Transaminases improving . Bilirubin normal.  NO fevers.  NO pain   Sister states that in the past , pt has had bouts of syncope during BM- told it was vasovagal  continue to monitor LFT

## 2022-11-07 LAB
ALBUMIN SERPL ELPH-MCNC: 3.8 G/DL — SIGNIFICANT CHANGE UP (ref 3.3–5.2)
ALP SERPL-CCNC: 304 U/L — HIGH (ref 40–120)
ALT FLD-CCNC: 1380 U/L — HIGH
ANION GAP SERPL CALC-SCNC: 13 MMOL/L — SIGNIFICANT CHANGE UP (ref 5–17)
AST SERPL-CCNC: 504 U/L — HIGH
BASOPHILS # BLD AUTO: 0.06 K/UL — SIGNIFICANT CHANGE UP (ref 0–0.2)
BASOPHILS NFR BLD AUTO: 0.8 % — SIGNIFICANT CHANGE UP (ref 0–2)
BILIRUB SERPL-MCNC: 0.6 MG/DL — SIGNIFICANT CHANGE UP (ref 0.4–2)
BUN SERPL-MCNC: 11.4 MG/DL — SIGNIFICANT CHANGE UP (ref 8–20)
CALCIUM SERPL-MCNC: 9.9 MG/DL — SIGNIFICANT CHANGE UP (ref 8.4–10.5)
CHLORIDE SERPL-SCNC: 107 MMOL/L — SIGNIFICANT CHANGE UP (ref 96–108)
CO2 SERPL-SCNC: 23 MMOL/L — SIGNIFICANT CHANGE UP (ref 22–29)
CREAT SERPL-MCNC: 0.67 MG/DL — SIGNIFICANT CHANGE UP (ref 0.5–1.3)
EGFR: 88 ML/MIN/1.73M2 — SIGNIFICANT CHANGE UP
EOSINOPHIL # BLD AUTO: 0.24 K/UL — SIGNIFICANT CHANGE UP (ref 0–0.5)
EOSINOPHIL NFR BLD AUTO: 3.2 % — SIGNIFICANT CHANGE UP (ref 0–6)
GLUCOSE SERPL-MCNC: 106 MG/DL — HIGH (ref 70–99)
HCT VFR BLD CALC: 36 % — SIGNIFICANT CHANGE UP (ref 34.5–45)
HGB BLD-MCNC: 11.9 G/DL — SIGNIFICANT CHANGE UP (ref 11.5–15.5)
IMM GRANULOCYTES NFR BLD AUTO: 0.4 % — SIGNIFICANT CHANGE UP (ref 0–0.9)
LYMPHOCYTES # BLD AUTO: 0.79 K/UL — LOW (ref 1–3.3)
LYMPHOCYTES # BLD AUTO: 10.5 % — LOW (ref 13–44)
MCHC RBC-ENTMCNC: 29.9 PG — SIGNIFICANT CHANGE UP (ref 27–34)
MCHC RBC-ENTMCNC: 33.1 GM/DL — SIGNIFICANT CHANGE UP (ref 32–36)
MCV RBC AUTO: 90.5 FL — SIGNIFICANT CHANGE UP (ref 80–100)
MONOCYTES # BLD AUTO: 0.51 K/UL — SIGNIFICANT CHANGE UP (ref 0–0.9)
MONOCYTES NFR BLD AUTO: 6.8 % — SIGNIFICANT CHANGE UP (ref 2–14)
NEUTROPHILS # BLD AUTO: 5.91 K/UL — SIGNIFICANT CHANGE UP (ref 1.8–7.4)
NEUTROPHILS NFR BLD AUTO: 78.3 % — HIGH (ref 43–77)
PHOSPHATE SERPL-MCNC: 3.2 MG/DL — SIGNIFICANT CHANGE UP (ref 2.4–4.7)
PLATELET # BLD AUTO: 156 K/UL — SIGNIFICANT CHANGE UP (ref 150–400)
POTASSIUM SERPL-MCNC: 3.5 MMOL/L — SIGNIFICANT CHANGE UP (ref 3.5–5.3)
POTASSIUM SERPL-SCNC: 3.5 MMOL/L — SIGNIFICANT CHANGE UP (ref 3.5–5.3)
PROT SERPL-MCNC: 6.3 G/DL — LOW (ref 6.6–8.7)
RBC # BLD: 3.98 M/UL — SIGNIFICANT CHANGE UP (ref 3.8–5.2)
RBC # FLD: 13.8 % — SIGNIFICANT CHANGE UP (ref 10.3–14.5)
SODIUM SERPL-SCNC: 142 MMOL/L — SIGNIFICANT CHANGE UP (ref 135–145)
WBC # BLD: 7.54 K/UL — SIGNIFICANT CHANGE UP (ref 3.8–10.5)
WBC # FLD AUTO: 7.54 K/UL — SIGNIFICANT CHANGE UP (ref 3.8–10.5)

## 2022-11-07 PROCEDURE — 99233 SBSQ HOSP IP/OBS HIGH 50: CPT

## 2022-11-07 RX ORDER — SODIUM CHLORIDE 9 MG/ML
1000 INJECTION, SOLUTION INTRAVENOUS
Refills: 0 | Status: DISCONTINUED | OUTPATIENT
Start: 2022-11-07 | End: 2022-11-08

## 2022-11-07 RX ORDER — POTASSIUM CHLORIDE 20 MEQ
40 PACKET (EA) ORAL ONCE
Refills: 0 | Status: COMPLETED | OUTPATIENT
Start: 2022-11-07 | End: 2022-11-07

## 2022-11-07 RX ORDER — POTASSIUM CHLORIDE 20 MEQ
40 PACKET (EA) ORAL ONCE
Refills: 0 | Status: DISCONTINUED | OUTPATIENT
Start: 2022-11-07 | End: 2022-11-07

## 2022-11-07 RX ORDER — DILTIAZEM HCL 120 MG
10 CAPSULE, EXT RELEASE 24 HR ORAL ONCE
Refills: 0 | Status: COMPLETED | OUTPATIENT
Start: 2022-11-07 | End: 2022-11-07

## 2022-11-07 RX ORDER — LACTULOSE 10 G/15ML
20 SOLUTION ORAL ONCE
Refills: 0 | Status: DISCONTINUED | OUTPATIENT
Start: 2022-11-07 | End: 2022-11-07

## 2022-11-07 RX ADMIN — PANTOPRAZOLE SODIUM 40 MILLIGRAM(S): 20 TABLET, DELAYED RELEASE ORAL at 05:18

## 2022-11-07 RX ADMIN — Medication 40 MILLIEQUIVALENT(S): at 11:21

## 2022-11-07 RX ADMIN — SODIUM CHLORIDE 75 MILLILITER(S): 9 INJECTION, SOLUTION INTRAVENOUS at 23:22

## 2022-11-07 RX ADMIN — PIPERACILLIN AND TAZOBACTAM 25 GRAM(S): 4; .5 INJECTION, POWDER, LYOPHILIZED, FOR SOLUTION INTRAVENOUS at 00:26

## 2022-11-07 RX ADMIN — Medication 3 MILLIGRAM(S): at 21:16

## 2022-11-07 RX ADMIN — Medication 25 MILLIGRAM(S): at 21:17

## 2022-11-07 RX ADMIN — SODIUM CHLORIDE 75 MILLILITER(S): 9 INJECTION, SOLUTION INTRAVENOUS at 09:31

## 2022-11-07 RX ADMIN — Medication 25 MILLIGRAM(S): at 14:44

## 2022-11-07 RX ADMIN — Medication 325 MILLIGRAM(S): at 11:21

## 2022-11-07 RX ADMIN — Medication 500 MILLIGRAM(S): at 18:28

## 2022-11-07 RX ADMIN — MIRABEGRON 25 MILLIGRAM(S): 50 TABLET, EXTENDED RELEASE ORAL at 11:20

## 2022-11-07 RX ADMIN — Medication 133 MILLILITER(S): at 18:29

## 2022-11-07 RX ADMIN — ATORVASTATIN CALCIUM 40 MILLIGRAM(S): 80 TABLET, FILM COATED ORAL at 21:17

## 2022-11-07 RX ADMIN — MEMANTINE HYDROCHLORIDE 10 MILLIGRAM(S): 10 TABLET ORAL at 05:17

## 2022-11-07 RX ADMIN — Medication 500 MILLIGRAM(S): at 11:25

## 2022-11-07 RX ADMIN — POLYETHYLENE GLYCOL 3350 17 GRAM(S): 17 POWDER, FOR SOLUTION ORAL at 05:16

## 2022-11-07 RX ADMIN — POLYETHYLENE GLYCOL 3350 17 GRAM(S): 17 POWDER, FOR SOLUTION ORAL at 18:28

## 2022-11-07 RX ADMIN — Medication 133 MILLILITER(S): at 05:16

## 2022-11-07 RX ADMIN — Medication 500 MILLIGRAM(S): at 05:18

## 2022-11-07 RX ADMIN — ENOXAPARIN SODIUM 40 MILLIGRAM(S): 100 INJECTION SUBCUTANEOUS at 11:20

## 2022-11-07 RX ADMIN — AMLODIPINE BESYLATE 5 MILLIGRAM(S): 2.5 TABLET ORAL at 05:17

## 2022-11-07 RX ADMIN — Medication 10 MILLIGRAM(S): at 18:27

## 2022-11-07 RX ADMIN — SERTRALINE 25 MILLIGRAM(S): 25 TABLET, FILM COATED ORAL at 11:21

## 2022-11-07 RX ADMIN — Medication 81 MILLIGRAM(S): at 11:20

## 2022-11-07 RX ADMIN — MEMANTINE HYDROCHLORIDE 10 MILLIGRAM(S): 10 TABLET ORAL at 18:28

## 2022-11-07 RX ADMIN — Medication 25 MILLIGRAM(S): at 05:17

## 2022-11-07 RX ADMIN — SENNA PLUS 2 TABLET(S): 8.6 TABLET ORAL at 11:21

## 2022-11-08 DIAGNOSIS — I10 ESSENTIAL (PRIMARY) HYPERTENSION: ICD-10-CM

## 2022-11-08 DIAGNOSIS — K52.89 OTHER SPECIFIED NONINFECTIVE GASTROENTERITIS AND COLITIS: ICD-10-CM

## 2022-11-08 DIAGNOSIS — R10.9 UNSPECIFIED ABDOMINAL PAIN: ICD-10-CM

## 2022-11-08 LAB
24R-OH-CALCIDIOL SERPL-MCNC: 31.3 NG/ML — SIGNIFICANT CHANGE UP (ref 30–80)
ALBUMIN SERPL ELPH-MCNC: 3.4 G/DL — SIGNIFICANT CHANGE UP (ref 3.3–5.2)
ALP SERPL-CCNC: 258 U/L — HIGH (ref 40–120)
ALT FLD-CCNC: >645 U/L — HIGH
ANION GAP SERPL CALC-SCNC: 10 MMOL/L — SIGNIFICANT CHANGE UP (ref 5–17)
AST SERPL-CCNC: 208 U/L — HIGH
BILIRUB SERPL-MCNC: 0.6 MG/DL — SIGNIFICANT CHANGE UP (ref 0.4–2)
BUN SERPL-MCNC: 9.2 MG/DL — SIGNIFICANT CHANGE UP (ref 8–20)
CALCIUM SERPL-MCNC: 9.7 MG/DL — SIGNIFICANT CHANGE UP (ref 8.4–10.5)
CHLORIDE SERPL-SCNC: 105 MMOL/L — SIGNIFICANT CHANGE UP (ref 96–108)
CO2 SERPL-SCNC: 22 MMOL/L — SIGNIFICANT CHANGE UP (ref 22–29)
CREAT SERPL-MCNC: 0.68 MG/DL — SIGNIFICANT CHANGE UP (ref 0.5–1.3)
EGFR: 88 ML/MIN/1.73M2 — SIGNIFICANT CHANGE UP
GLUCOSE SERPL-MCNC: 91 MG/DL — SIGNIFICANT CHANGE UP (ref 70–99)
LIDOCAIN IGE QN: 134 U/L — HIGH (ref 22–51)
POTASSIUM SERPL-MCNC: 4.2 MMOL/L — SIGNIFICANT CHANGE UP (ref 3.5–5.3)
POTASSIUM SERPL-SCNC: 4.2 MMOL/L — SIGNIFICANT CHANGE UP (ref 3.5–5.3)
PROT SERPL-MCNC: 6.2 G/DL — LOW (ref 6.6–8.7)
SODIUM SERPL-SCNC: 137 MMOL/L — SIGNIFICANT CHANGE UP (ref 135–145)
TSH SERPL-MCNC: 0.77 UIU/ML — SIGNIFICANT CHANGE UP (ref 0.27–4.2)
VIT B12 SERPL-MCNC: 1824 PG/ML — HIGH (ref 232–1245)

## 2022-11-08 PROCEDURE — 99233 SBSQ HOSP IP/OBS HIGH 50: CPT

## 2022-11-08 PROCEDURE — 99232 SBSQ HOSP IP/OBS MODERATE 35: CPT

## 2022-11-08 RX ADMIN — Medication 81 MILLIGRAM(S): at 12:34

## 2022-11-08 RX ADMIN — Medication 25 MILLIGRAM(S): at 21:18

## 2022-11-08 RX ADMIN — Medication 3 MILLIGRAM(S): at 21:18

## 2022-11-08 RX ADMIN — SENNA PLUS 2 TABLET(S): 8.6 TABLET ORAL at 13:06

## 2022-11-08 RX ADMIN — Medication 500 MILLIGRAM(S): at 12:33

## 2022-11-08 RX ADMIN — Medication 500 MILLIGRAM(S): at 05:28

## 2022-11-08 RX ADMIN — Medication 500 MILLIGRAM(S): at 17:34

## 2022-11-08 RX ADMIN — Medication 325 MILLIGRAM(S): at 12:32

## 2022-11-08 RX ADMIN — ENOXAPARIN SODIUM 40 MILLIGRAM(S): 100 INJECTION SUBCUTANEOUS at 12:32

## 2022-11-08 RX ADMIN — AMLODIPINE BESYLATE 5 MILLIGRAM(S): 2.5 TABLET ORAL at 05:28

## 2022-11-08 RX ADMIN — SERTRALINE 25 MILLIGRAM(S): 25 TABLET, FILM COATED ORAL at 13:07

## 2022-11-08 RX ADMIN — Medication 133 MILLILITER(S): at 05:28

## 2022-11-08 RX ADMIN — Medication 25 MILLIGRAM(S): at 05:28

## 2022-11-08 RX ADMIN — POLYETHYLENE GLYCOL 3350 17 GRAM(S): 17 POWDER, FOR SOLUTION ORAL at 05:29

## 2022-11-08 RX ADMIN — Medication 25 MILLIGRAM(S): at 13:06

## 2022-11-08 RX ADMIN — PANTOPRAZOLE SODIUM 40 MILLIGRAM(S): 20 TABLET, DELAYED RELEASE ORAL at 05:33

## 2022-11-08 RX ADMIN — MEMANTINE HYDROCHLORIDE 10 MILLIGRAM(S): 10 TABLET ORAL at 17:34

## 2022-11-08 RX ADMIN — MEMANTINE HYDROCHLORIDE 10 MILLIGRAM(S): 10 TABLET ORAL at 05:28

## 2022-11-08 RX ADMIN — SODIUM CHLORIDE 75 MILLILITER(S): 9 INJECTION, SOLUTION INTRAVENOUS at 12:36

## 2022-11-08 RX ADMIN — MIRABEGRON 25 MILLIGRAM(S): 50 TABLET, EXTENDED RELEASE ORAL at 10:17

## 2022-11-08 NOTE — PROGRESS NOTE ADULT - PROBLEM SELECTOR PLAN 1
Continuing to downtrend     - Trend LFTs. Avoid hepatotoxins.  - Viral hepatitis serologies and autoimmune workup are pending   - If LFTs remain elevated, can consider EUS next week but would need cardiac clearance ( unable to do MRCP due to PPM)   - If LFTs return to normal range, likely 2/2 ischemic event.

## 2022-11-09 ENCOUNTER — TRANSCRIPTION ENCOUNTER (OUTPATIENT)
Age: 82
End: 2022-11-09

## 2022-11-09 LAB
ALBUMIN SERPL ELPH-MCNC: 3.4 G/DL — SIGNIFICANT CHANGE UP (ref 3.3–5.2)
ALP SERPL-CCNC: 216 U/L — HIGH (ref 40–120)
ALT FLD-CCNC: 580 U/L — HIGH
ANION GAP SERPL CALC-SCNC: 9 MMOL/L — SIGNIFICANT CHANGE UP (ref 5–17)
AST SERPL-CCNC: 106 U/L — HIGH
BASOPHILS # BLD AUTO: 0.05 K/UL — SIGNIFICANT CHANGE UP (ref 0–0.2)
BASOPHILS NFR BLD AUTO: 0.7 % — SIGNIFICANT CHANGE UP (ref 0–2)
BILIRUB DIRECT SERPL-MCNC: 0.1 MG/DL — SIGNIFICANT CHANGE UP (ref 0–0.3)
BILIRUB INDIRECT FLD-MCNC: 0.1 MG/DL — LOW (ref 0.2–1)
BILIRUB SERPL-MCNC: 0.2 MG/DL — LOW (ref 0.4–2)
BUN SERPL-MCNC: 21.3 MG/DL — HIGH (ref 8–20)
CALCIUM SERPL-MCNC: 9.5 MG/DL — SIGNIFICANT CHANGE UP (ref 8.4–10.5)
CHLORIDE SERPL-SCNC: 105 MMOL/L — SIGNIFICANT CHANGE UP (ref 96–108)
CHOLEST SERPL-MCNC: 154 MG/DL — SIGNIFICANT CHANGE UP
CMV DNA CSF QL NAA+PROBE: SIGNIFICANT CHANGE UP
CMV DNA SPEC NAA+PROBE-LOG#: SIGNIFICANT CHANGE UP LOG10IU/ML
CMV IGM FLD-ACNC: <8 AU/ML — SIGNIFICANT CHANGE UP
CMV IGM SERPL QL: NEGATIVE — SIGNIFICANT CHANGE UP
CO2 SERPL-SCNC: 26 MMOL/L — SIGNIFICANT CHANGE UP (ref 22–29)
CREAT SERPL-MCNC: 0.8 MG/DL — SIGNIFICANT CHANGE UP (ref 0.5–1.3)
EBV EA AB SER IA-ACNC: 44.9 U/ML — HIGH
EBV EA AB TITR SER IF: POSITIVE
EBV EA IGG SER-ACNC: POSITIVE
EBV NA IGG SER IA-ACNC: >600 U/ML — HIGH
EBV PATRN SPEC IB-IMP: SIGNIFICANT CHANGE UP
EBV VCA IGG AVIDITY SER QL IA: POSITIVE
EBV VCA IGM SER IA-ACNC: 264 U/ML — HIGH
EBV VCA IGM SER IA-ACNC: <10 U/ML — SIGNIFICANT CHANGE UP
EBV VCA IGM TITR FLD: NEGATIVE — SIGNIFICANT CHANGE UP
EGFR: 74 ML/MIN/1.73M2 — SIGNIFICANT CHANGE UP
EOSINOPHIL # BLD AUTO: 0.17 K/UL — SIGNIFICANT CHANGE UP (ref 0–0.5)
EOSINOPHIL NFR BLD AUTO: 2.4 % — SIGNIFICANT CHANGE UP (ref 0–6)
GLUCOSE SERPL-MCNC: 151 MG/DL — HIGH (ref 70–99)
HAV IGM SER-ACNC: SIGNIFICANT CHANGE UP
HBV CORE IGM SER-ACNC: SIGNIFICANT CHANGE UP
HBV E AB SER-ACNC: SIGNIFICANT CHANGE UP
HBV E AG SER-ACNC: SIGNIFICANT CHANGE UP
HBV SURFACE AG SER-ACNC: SIGNIFICANT CHANGE UP
HCT VFR BLD CALC: 33 % — LOW (ref 34.5–45)
HCV AB S/CO SERPL IA: 0.06 S/CO — SIGNIFICANT CHANGE UP (ref 0–0.99)
HCV AB SERPL-IMP: SIGNIFICANT CHANGE UP
HDLC SERPL-MCNC: 56 MG/DL — SIGNIFICANT CHANGE UP
HGB BLD-MCNC: 10.6 G/DL — LOW (ref 11.5–15.5)
IMM GRANULOCYTES NFR BLD AUTO: 0.3 % — SIGNIFICANT CHANGE UP (ref 0–0.9)
LIPID PNL WITH DIRECT LDL SERPL: 84 MG/DL — SIGNIFICANT CHANGE UP
LYMPHOCYTES # BLD AUTO: 1.02 K/UL — SIGNIFICANT CHANGE UP (ref 1–3.3)
LYMPHOCYTES # BLD AUTO: 14.1 % — SIGNIFICANT CHANGE UP (ref 13–44)
MCHC RBC-ENTMCNC: 29.3 PG — SIGNIFICANT CHANGE UP (ref 27–34)
MCHC RBC-ENTMCNC: 32.1 GM/DL — SIGNIFICANT CHANGE UP (ref 32–36)
MCV RBC AUTO: 91.2 FL — SIGNIFICANT CHANGE UP (ref 80–100)
MITOCHONDRIA AB SER-ACNC: SIGNIFICANT CHANGE UP
MONOCYTES # BLD AUTO: 0.41 K/UL — SIGNIFICANT CHANGE UP (ref 0–0.9)
MONOCYTES NFR BLD AUTO: 5.7 % — SIGNIFICANT CHANGE UP (ref 2–14)
NEUTROPHILS # BLD AUTO: 5.54 K/UL — SIGNIFICANT CHANGE UP (ref 1.8–7.4)
NEUTROPHILS NFR BLD AUTO: 76.8 % — SIGNIFICANT CHANGE UP (ref 43–77)
NON HDL CHOLESTEROL: 98 MG/DL — SIGNIFICANT CHANGE UP
PLATELET # BLD AUTO: 183 K/UL — SIGNIFICANT CHANGE UP (ref 150–400)
POTASSIUM SERPL-MCNC: 4.5 MMOL/L — SIGNIFICANT CHANGE UP (ref 3.5–5.3)
POTASSIUM SERPL-SCNC: 4.5 MMOL/L — SIGNIFICANT CHANGE UP (ref 3.5–5.3)
PROT SERPL-MCNC: 6 G/DL — LOW (ref 6.6–8.7)
RBC # BLD: 3.62 M/UL — LOW (ref 3.8–5.2)
RBC # FLD: 13.9 % — SIGNIFICANT CHANGE UP (ref 10.3–14.5)
SMOOTH MUSCLE AB SER-ACNC: SIGNIFICANT CHANGE UP
SODIUM SERPL-SCNC: 140 MMOL/L — SIGNIFICANT CHANGE UP (ref 135–145)
TRIGL SERPL-MCNC: 69 MG/DL — SIGNIFICANT CHANGE UP
WBC # BLD: 7.21 K/UL — SIGNIFICANT CHANGE UP (ref 3.8–10.5)
WBC # FLD AUTO: 7.21 K/UL — SIGNIFICANT CHANGE UP (ref 3.8–10.5)

## 2022-11-09 PROCEDURE — 99233 SBSQ HOSP IP/OBS HIGH 50: CPT

## 2022-11-09 PROCEDURE — 99232 SBSQ HOSP IP/OBS MODERATE 35: CPT

## 2022-11-09 PROCEDURE — 74018 RADEX ABDOMEN 1 VIEW: CPT | Mod: 26

## 2022-11-09 RX ORDER — FERROUS SULFATE 325(65) MG
1 TABLET ORAL
Qty: 30 | Refills: 0
Start: 2022-11-09 | End: 2022-12-08

## 2022-11-09 RX ORDER — POLYETHYLENE GLYCOL 3350 17 G/17G
17 POWDER, FOR SOLUTION ORAL
Qty: 1020 | Refills: 0
Start: 2022-11-09 | End: 2022-12-08

## 2022-11-09 RX ORDER — MINERAL OIL
133 OIL (ML) MISCELLANEOUS
Qty: 2660 | Refills: 0
Start: 2022-11-09 | End: 2022-11-18

## 2022-11-09 RX ORDER — ASCORBIC ACID 60 MG
1 TABLET,CHEWABLE ORAL
Qty: 60 | Refills: 0
Start: 2022-11-09 | End: 2022-12-08

## 2022-11-09 RX ORDER — LANOLIN ALCOHOL/MO/W.PET/CERES
1 CREAM (GRAM) TOPICAL
Qty: 0 | Refills: 0 | DISCHARGE
Start: 2022-11-09

## 2022-11-09 RX ORDER — ASPIRIN/CALCIUM CARB/MAGNESIUM 324 MG
1 TABLET ORAL
Qty: 0 | Refills: 0 | DISCHARGE
Start: 2022-11-09

## 2022-11-09 RX ORDER — METOPROLOL TARTRATE 50 MG
1 TABLET ORAL
Qty: 0 | Refills: 0 | DISCHARGE
Start: 2022-11-09

## 2022-11-09 RX ORDER — ACETAMINOPHEN 500 MG
2 TABLET ORAL
Qty: 0 | Refills: 0 | DISCHARGE
Start: 2022-11-09

## 2022-11-09 RX ORDER — MESALAMINE 400 MG
1 TABLET, DELAYED RELEASE (ENTERIC COATED) ORAL
Qty: 0 | Refills: 0 | DISCHARGE
Start: 2022-11-09

## 2022-11-09 RX ORDER — SENNA PLUS 8.6 MG/1
2 TABLET ORAL
Qty: 60 | Refills: 0
Start: 2022-11-09 | End: 2022-12-08

## 2022-11-09 RX ORDER — PANTOPRAZOLE SODIUM 20 MG/1
1 TABLET, DELAYED RELEASE ORAL
Qty: 0 | Refills: 0 | DISCHARGE
Start: 2022-11-09

## 2022-11-09 RX ADMIN — Medication 500 MILLIGRAM(S): at 17:57

## 2022-11-09 RX ADMIN — SERTRALINE 25 MILLIGRAM(S): 25 TABLET, FILM COATED ORAL at 11:29

## 2022-11-09 RX ADMIN — Medication 25 MILLIGRAM(S): at 05:54

## 2022-11-09 RX ADMIN — POLYETHYLENE GLYCOL 3350 17 GRAM(S): 17 POWDER, FOR SOLUTION ORAL at 17:02

## 2022-11-09 RX ADMIN — AMLODIPINE BESYLATE 5 MILLIGRAM(S): 2.5 TABLET ORAL at 05:54

## 2022-11-09 RX ADMIN — Medication 325 MILLIGRAM(S): at 11:29

## 2022-11-09 RX ADMIN — SENNA PLUS 2 TABLET(S): 8.6 TABLET ORAL at 17:03

## 2022-11-09 RX ADMIN — MIRABEGRON 25 MILLIGRAM(S): 50 TABLET, EXTENDED RELEASE ORAL at 10:10

## 2022-11-09 RX ADMIN — ENOXAPARIN SODIUM 40 MILLIGRAM(S): 100 INJECTION SUBCUTANEOUS at 11:29

## 2022-11-09 RX ADMIN — Medication 25 MILLIGRAM(S): at 13:11

## 2022-11-09 RX ADMIN — MEMANTINE HYDROCHLORIDE 10 MILLIGRAM(S): 10 TABLET ORAL at 17:03

## 2022-11-09 RX ADMIN — Medication 81 MILLIGRAM(S): at 11:28

## 2022-11-09 RX ADMIN — Medication 25 MILLIGRAM(S): at 21:21

## 2022-11-09 RX ADMIN — MEMANTINE HYDROCHLORIDE 10 MILLIGRAM(S): 10 TABLET ORAL at 05:54

## 2022-11-09 RX ADMIN — PANTOPRAZOLE SODIUM 40 MILLIGRAM(S): 20 TABLET, DELAYED RELEASE ORAL at 05:56

## 2022-11-09 RX ADMIN — Medication 500 MILLIGRAM(S): at 11:29

## 2022-11-09 RX ADMIN — Medication 3 MILLIGRAM(S): at 21:21

## 2022-11-09 RX ADMIN — Medication 500 MILLIGRAM(S): at 05:55

## 2022-11-09 NOTE — DIETITIAN NUTRITION RISK NOTIFICATION - TREATMENT: THE FOLLOWING DIET HAS BEEN RECOMMENDED
Diet, Regular:   Supplement Feeding Modality:  Oral  Ensure Enlive Cans or Servings Per Day:  1       Frequency:  Three Times a day (11-08-22 @ 08:44) [Active]

## 2022-11-09 NOTE — DIETITIAN INITIAL EVALUATION ADULT - OTHER INFO
81F with PMHX Alzheimer's Dementia, Aortic Valve replacement, PPM ([facility] 2016), MDD, HTN, HLD, Ulcerative Colitis, Chronic Constipation, GERD presents to [facility] ER c/o acute onset epigastric abdominal pain with nausea/vomiting admitted for Acute Pancreatitis vs Severe Constipation/Stercoral Colitis. GI following noted.

## 2022-11-09 NOTE — DIETITIAN INITIAL EVALUATION ADULT - ETIOLOGY
related to inability to consume increased protein energy needs in the setting of acute pancreatitis, dementia related to inability to consume increased protein energy needs in the setting of acute pancreatitis, dementia, constipation

## 2022-11-09 NOTE — DIETITIAN INITIAL EVALUATION ADULT - ORAL INTAKE PTA/DIET HISTORY
Limited interview conducted with pt due to hx of dementia at bedside while eating breakfast. Pt states good appetite/po intake prior to admission and denies wt loss. EMR documentation suggests 50-75% consumption. Pt unable to verbalize UBW, but provided ht of 5'5". Encouraged pt to eat HBV proteins and supplementation. RD to remain available.  Limited interview conducted with pt due to hx of dementia at bedside while eating breakfast. Pt states good appetite/po intake prior to admission. Wt taken from bed scale 130lb, unclear accuracy of weights possible wt loss since admission 2/2 severe constipation and clear liquid diet.  EMR documentation suggests 50-75% consumption. Pt unable to verbalize UBW, but provided ht of 5'5". Encouraged pt to eat HBV proteins and supplementation. RD to remain available.

## 2022-11-09 NOTE — DIETITIAN INITIAL EVALUATION ADULT - ADD RECOMMEND
1)Rx: MVI and continue vit C 500mg daily.   2) Continue Add Ensure TID to optimize po intake and provide an additional 350kcal, 20g protein per serving  3) Continue bowel regimen as needed    1)Rx: MVI and continue vit C 500mg daily.   2) Continue Ensure TID to optimize po intake and provide an additional 350kcal, 20g protein per serving  3) Continue bowel regimen as needed

## 2022-11-09 NOTE — DIETITIAN NUTRITION RISK NOTIFICATION - ADDITIONAL COMMENTS/DIETITIAN RECOMMENDATIONS
1)Rx: MVI and continue vit C 500mg daily.   2) Continue Ensure TID to optimize po intake and provide an additional 350kcal, 20g protein per serving  3) Continue bowel regimen as needed

## 2022-11-09 NOTE — DISCHARGE NOTE PROVIDER - NSDCCPCAREPLAN_GEN_ALL_CORE_FT
PRINCIPAL DISCHARGE DIAGNOSIS  Diagnosis: Abdominal pain  Assessment and Plan of Treatment: improved , avoid constipation   follow up with your gastroeneterologist in 1-2 weeks      SECONDARY DISCHARGE DIAGNOSES  Diagnosis: Stercoral colitis  Assessment and Plan of Treatment: avoid constipation , use enema as needed    Diagnosis: Constipation  Assessment and Plan of Treatment: cronic , improving   cont current bowel regimen    Diagnosis: Transaminitis  Assessment and Plan of Treatment: improving , continue to hold atorvastatin till see your gastro due to high liver enzymes    Diagnosis: HTN (hypertension)  Assessment and Plan of Treatment: controlled    Diagnosis: Alzheimer's dementia  Assessment and Plan of Treatment:     Diagnosis: Moderate protein-calorie malnutrition  Assessment and Plan of Treatment:      PRINCIPAL DISCHARGE DIAGNOSIS  Diagnosis: Abdominal pain  Assessment and Plan of Treatment: pancreatitis   need follow up with gastro team for EUS   follow up with your gastroeneterologist in 1-2 weeks      SECONDARY DISCHARGE DIAGNOSES  Diagnosis: Transaminitis  Assessment and Plan of Treatment: improving , continue to hold atorvastatin till see your gastro due to high liver enzymes    Diagnosis: Stercoral colitis  Assessment and Plan of Treatment: avoid constipation , use enema as needed    Diagnosis: Constipation  Assessment and Plan of Treatment: cronic , improving   cont current bowel regimen    Diagnosis: HTN (hypertension)  Assessment and Plan of Treatment: controlled    Diagnosis: Alzheimer's dementia  Assessment and Plan of Treatment:     Diagnosis: Moderate protein-calorie malnutrition  Assessment and Plan of Treatment: take ensure with meals . multivitamin    Diagnosis: Ulcerative colitis  Assessment and Plan of Treatment:      PRINCIPAL DISCHARGE DIAGNOSIS  Diagnosis: Abdominal pain  Assessment and Plan of Treatment: pancreatitis   need follow up with gastroenterologist for EUS   follow up with your gastroeneterologist in 1-2 weeks      SECONDARY DISCHARGE DIAGNOSES  Diagnosis: Transaminitis  Assessment and Plan of Treatment: improving , continue to hold atorvastatin till see your gastro due to high liver enzymes  repeat blood test in 2 weeks    Diagnosis: Constipation  Assessment and Plan of Treatment: cronic , improving , monitor bowel movements   cont miralax 2-3 days daily and senna , if no BM for 2-3 days use dulcolax supp or enema   add fiber to diet , oral fluid intake    Diagnosis: Stercoral colitis  Assessment and Plan of Treatment: avoid constipation , use enema as needed    Diagnosis: HTN (hypertension)  Assessment and Plan of Treatment: controlled    Diagnosis: Alzheimer's dementia  Assessment and Plan of Treatment: continue home meds    Diagnosis: Moderate protein-calorie malnutrition  Assessment and Plan of Treatment: take ensure with meals  and multivitamin    Diagnosis: Ulcerative colitis  Assessment and Plan of Treatment: cont mesalamine

## 2022-11-09 NOTE — STUDENT SIGN OFF DOCUMENT - COPY OF STUDENT DOCUMENT REVIEW
LM on VM asking if patient would switch 4/27 clinic appointment to telephone visit.  If calls back, please offer later today, tomorrow around 2pm, or Thursday at [address] [place] [address]. Thanks
Pt chose to reschedule to 5/29/20.
When patient returns call, provider preference for phone visit would be 4/15 or 4/17 between 2-4 pm and 4/28 between 2-3.
Dietetic Intern

## 2022-11-09 NOTE — DISCHARGE NOTE PROVIDER - PROVIDER TOKENS
FREE:[LAST:[DR Horan],PHONE:[(   )    -],FAX:[(   )    -]] FREE:[LAST:[DR Horan],PHONE:[(   )    -],FAX:[(   )    -]],PROVIDER:[TOKEN:[[id number]],FOLLOWUP:[1 week]] PROVIDER:[TOKEN:[[id number]],FOLLOWUP:[2 weeks]],FREE:[LAST:[DR Horan],PHONE:[(   )    -],FAX:[(   )    -]]

## 2022-11-09 NOTE — DIETITIAN INITIAL EVALUATION ADULT - PERTINENT MEDS FT
MEDICATIONS  (STANDING):  amLODIPine   Tablet 5 milliGRAM(s) Oral daily  ascorbic acid 500 milliGRAM(s) Oral daily  aspirin  chewable 81 milliGRAM(s) Oral daily  enoxaparin Injectable 40 milliGRAM(s) SubCutaneous every 24 hours  ferrous    sulfate 325 milliGRAM(s) Oral daily  memantine 10 milliGRAM(s) Oral two times a day  mesalamine ER Capsule 500 milliGRAM(s) Oral two times a day  metoprolol tartrate 25 milliGRAM(s) Oral three times a day  mineral oil enema 133 milliLiter(s) Rectal two times a day  mirabegron ER 25 milliGRAM(s) Oral <User Schedule>  OLANZapine Injectable 5 milliGRAM(s) IntraMuscular once  pantoprazole    Tablet 40 milliGRAM(s) Oral before breakfast  polyethylene glycol 3350 17 Gram(s) Oral two times a day  senna 2 Tablet(s) Oral daily  sertraline 25 milliGRAM(s) Oral daily    MEDICATIONS  (PRN):  acetaminophen     Tablet .. 650 milliGRAM(s) Oral every 6 hours PRN Temp greater or equal to 38C (100.4F), Mild Pain (1 - 3)  aluminum hydroxide/magnesium hydroxide/simethicone Suspension 30 milliLiter(s) Oral every 4 hours PRN Dyspepsia  melatonin 3 milliGRAM(s) Oral at bedtime PRN Insomnia  ondansetron Injectable 4 milliGRAM(s) IV Push every 8 hours PRN Nausea and/or Vomiting

## 2022-11-09 NOTE — DISCHARGE NOTE PROVIDER - ATTENDING DISCHARGE PHYSICAL EXAMINATION:
pt is seen in am , no distress   no overnight events   vitals stable   Lungs : CTA bilateral   Heart : regular s1 /s2   Abd soft no tenderness , BS positive   Ext :no edema    pt is seen in am , no distress   no overnight events   vitals stable   Lungs : CTA bilateral   Heart : regular s1 /s2   Abd soft no tenderness , BS positive   Ext :no edema   had multiple BMs yesterday and this am per nursing , no abd pain

## 2022-11-09 NOTE — DISCHARGE NOTE PROVIDER - HOSPITAL COURSE
81F with PMHX Alzheimer's Dementia, Aortic Valve replacement, PPM ([facility] 2016), MDD, HTN, HLD, Ulcerative Colitis, Chronic Constipation, GERD presents to [facility] ER c/o acute onset epigastric abdominal pain which started after eating large Pancake breakfast yesterday. Abd pain resolved after episode of vomiting in ED, vomitus consisted of previously digested food. No current abdominal pain or further episodes of N/V.   In ED, RUQ US shows dilated CBD. CT imaging with constipation/stercoral colitis. LFTs elevated Alk Phos 370, AST 4034, ALT 3477, Lipase >3000, bilirubin normal. Pt is admitted clear liquid diet IVF started   She had GI evaluation ., can not get MRCP due to PPM , pt is improving LFTS cont to trend down , she is tolerating diet advanced   Having BMs . stable ambulated   Son is updated daily at the bedside     total time spend coordinating care 40 min        81F with PMHX Alzheimer's Dementia, Aortic Valve replacement, PPM ([facility] 2016), MDD, HTN, HLD, Ulcerative Colitis, Chronic Constipation, GERD presents to [facility] ER c/o acute onset epigastric abdominal pain which started after eating large Pancake breakfast yesterday. Abd pain resolved after episode of vomiting in ED, vomitus consisted of previously digested food. No current abdominal pain or further episodes of N/V.   In ED, RUQ US shows dilated CBD. CT imaging with constipation/stercoral colitis. LFTs elevated Alk Phos 370, AST 4034, ALT 3477, Lipase >3000, bilirubin normal. Pt is admitted for possible pencreatitis , transaminitis , constipation started on clear liquid diet IVF , Gi consulted ,   She had GI evaluation ., can not get MRCP due to PPM , pt is improving LFTS cont to trend down , she is tolerating diet advanced   Having BMs . stable ambulated   Son is updated daily at the bedside     total time spend coordinating care 40 min        81F with PMHX Alzheimer's Dementia, Aortic Valve replacement, PPM ([facility] 2016), MDD, HTN, HLD, Ulcerative Colitis, Chronic Constipation, GERD presents to [facility] ER c/o acute onset epigastric abdominal pain which started after eating large Pancake breakfast yesterday. Abd pain resolved after episode of vomiting in ED, vomitus consisted of previously digested food. No current abdominal pain or further episodes of N/V.   In ED, RUQ US shows dilated CBD. CT imaging with constipation/stercoral colitis. Calcification in pnacreas head, LFTs elevated Alk Phos 370, AST 4034, ALT 3477, Lipase >3000, bilirubin normal. Pt is admitted for possible pencreatitis , transaminitis , and severe constipation started on clear liquid diet with IVF initially , GI  consulted   She can not get MRCP due to PPM , pt is improving diet  advanced , miralx bid , mineral oil enema and senna started LFTS cont to trend down   Having BMs . stable ambulated , KUB on 11/9 showed nonobstructive constipation , nopo obstruction , GI recalled to evaluate , given movie prep 11/10 , had 2 BM since movie prep , large soft per nursing   Pt's gastroenterologist DR Rodriguez - [phone] called today , updated about hospitalization ,CT scan findings ,lab result , rec outpatient follow   up with gastro for EUS , pt also will need repeat LFTs check , son is aware he needs to monitor pt's BM as she is with Alzheimer , use miralx 2-3 times aday and senna , supposotiry or enema if no BM for 2-3 days , her Gi attemding agrees with plan , hepatitis serology neg hep c . b , EBV ig G positive , d/w GI and ID likely old infection , LFTs are improving , liekly not the cause , she will follow up with GI to get further testing as needed   Son is updated daily and on discharge , spoke to him 3 times today       total time spend coordinating care 40 min

## 2022-11-09 NOTE — PROGRESS NOTE ADULT - PROBLEM SELECTOR PLAN 1
No CMP today, LFTs downtrending yesterday. No abd pain. Pt afebrile. No leukocytosis.  - Continue to trend LFTs. Avoid hepatotoxins.  - Viral hepatitis serologies noted. Autoimmune workup are pending   - If LFTs remain elevated, can consider EUS next week but would need cardiac clearance (unable to do MRCP due to PPM)   - If LFTs return to normal range, likely reactive to ischemic event. LFTs slightly downtrending . No abd pain. Pt afebrile. No leukocytosis.  - Continue to trend LFTs. Avoid hepatotoxins.  - Viral hepatitis serologies noted.   - Will need OPT F/u with Gastroenterologist for EUS (unable to do MRCP due to PPM)   - No further GI recommendations.

## 2022-11-09 NOTE — DISCHARGE NOTE PROVIDER - DETAILS OF MALNUTRITION DIAGNOSIS/DIAGNOSES
This patient has been assessed with a concern for Malnutrition and was treated during this hospitalization for the following Nutrition diagnosis/diagnoses:     -  11/09/2022: Moderate protein-calorie malnutrition

## 2022-11-09 NOTE — DISCHARGE NOTE PROVIDER - NSDCFUADDAPPT_GEN_ALL_CORE_FT
follow up with your gastroenterologist in 1-2 weeks   get liver tests checked  follow up with your gastroenterologist in 1-2 weeks   get liver tests checked     follow up with [facility] in [place] for follow up and possible endoscopic US  follow up with your gastroenterologist   get liver tests checked in 1-2 weeks   EBV test IG g positive discuss with your gastroenterologist for further testing as needed     follow up need endoscopic US to evaluate calcific densities in pancreas

## 2022-11-09 NOTE — DISCHARGE NOTE PROVIDER - NSDCMRMEDTOKEN_GEN_ALL_CORE_FT
atorvastatin 40 mg oral tablet: 1 tab(s) orally once a day  Colace 100 mg oral capsule: 1 cap(s) orally 2 times a day  memantine 28 mg oral capsule, extended release: 1 cap(s) orally once a day  mesalamine 500 mg oral capsule, extended release: 1 cap(s) orally 2 times a day  Metoprolol Tartrate 25 mg oral tablet: 1 tab(s) orally 3 times a day  mirabegron 25 mg oral tablet, extended release: 1 tab(s) orally once a day  MiraLax oral powder for reconstitution: 17 gram(s) orally once a day  pantoprazole 40 mg oral delayed release tablet: 1 tab(s) orally once a day  sertraline 25 mg oral tablet: 1 tab(s) orally once a day   ascorbic acid 500 mg oral tablet: 1 tab(s) orally 2 times a day   aspirin 81 mg oral tablet, chewable: 1 tab(s) orally once a day  Colace 100 mg oral capsule: 1 cap(s) orally 2 times a day  ferrous sulfate 325 mg (65 mg elemental iron) oral tablet: 1 tab(s) orally once a day  melatonin 3 mg oral tablet: 1 tab(s) orally once a day (at bedtime), As needed, Insomnia  memantine 28 mg oral capsule, extended release: 1 cap(s) orally once a day  mesalamine 500 mg oral capsule, extended release: 1 cap(s) orally 2 times a day  mesalamine 500 mg oral capsule, extended release: 1 cap(s) orally 2 times a day  metoprolol tartrate 25 mg oral tablet: 1 tab(s) orally 3 times a day  Metoprolol Tartrate 25 mg oral tablet: 1 tab(s) orally 3 times a day  mineral oil rectal enema: 133 milliliter(s) rectal 2 times a day as needed if no BM for 3 days   mirabegron 25 mg oral tablet, extended release: 1 tab(s) orally once a day  MiraLax oral powder for reconstitution: 17 gram(s) orally once a day  pantoprazole 40 mg oral delayed release tablet: 1 tab(s) orally once a day  pantoprazole 40 mg oral delayed release tablet: 1 tab(s) orally once a day (before a meal)  senna leaf extract oral tablet: 2 tab(s) orally once a day  sertraline 25 mg oral tablet: 1 tab(s) orally once a day   acetaminophen 325 mg oral tablet: 2 tab(s) orally every 6 hours, As needed, Temp greater or equal to 38C (100.4F), Mild Pain (1 - 3)  ascorbic acid 500 mg oral tablet: 1 tab(s) orally 2 times a day   aspirin 81 mg oral tablet, chewable: 1 tab(s) orally once a day  ferrous sulfate 325 mg (65 mg elemental iron) oral tablet: 1 tab(s) orally once a day  melatonin 3 mg oral tablet: 1 tab(s) orally once a day (at bedtime), As needed, Insomnia  memantine 28 mg oral capsule, extended release: 1 cap(s) orally once a day  mesalamine 500 mg oral capsule, extended release: 1 cap(s) orally 2 times a day  metoprolol tartrate 25 mg oral tablet: 1 tab(s) orally 3 times a day  mineral oil rectal enema: 133 milliliter(s) rectal 2 times a day as needed if no BM for 3 days   mirabegron 25 mg oral tablet, extended release: 1 tab(s) orally once a day  MiraLax oral powder for reconstitution: 17 gram(s) orally once a day  pantoprazole 40 mg oral delayed release tablet: 1 tab(s) orally once a day (before a meal)  senna leaf extract oral tablet: 2 tab(s) orally once a day  sertraline 25 mg oral tablet: 1 tab(s) orally once a day   acetaminophen 325 mg oral tablet: 2 tab(s) orally every 6 hours, As needed, Temp greater or equal to 38C (100.4F), Mild Pain (1 - 3)  ascorbic acid 500 mg oral tablet: 1 tab(s) orally 2 times a day   aspirin 81 mg oral tablet, chewable: 1 tab(s) orally once a day  ferrous sulfate 325 mg (65 mg elemental iron) oral tablet: 1 tab(s) orally once a day  melatonin 3 mg oral tablet: 1 tab(s) orally once a day (at bedtime), As needed, Insomnia  memantine 28 mg oral capsule, extended release: 1 cap(s) orally once a day  mesalamine 500 mg oral capsule, extended release: 1 cap(s) orally 2 times a day  metoprolol tartrate 25 mg oral tablet: 1 tab(s) orally 3 times a day  mineral oil rectal enema: 133 milliliter(s) rectal 2 times a day as needed if no BM for 3 days   mirabegron 25 mg oral tablet, extended release: 1 tab(s) orally once a day  MiraLax oral powder for reconstitution: 17 gram(s) orally 1 or 2 times a day   pantoprazole 40 mg oral delayed release tablet: 1 tab(s) orally once a day (before a meal)  senna leaf extract oral tablet: 2 tab(s) orally once a day  sertraline 25 mg oral tablet: 1 tab(s) orally once a day   acetaminophen 325 mg oral tablet: 2 tab(s) orally every 6 hours, As needed, Temp greater or equal to 38C (100.4F), Mild Pain (1 - 3)  ascorbic acid 500 mg oral tablet: 1 tab(s) orally 2 times a day   aspirin 81 mg oral tablet, chewable: 1 tab(s) orally once a day  ferrous sulfate 325 mg (65 mg elemental iron) oral tablet: 1 tab(s) orally once a day  melatonin 3 mg oral tablet: 1 tab(s) orally once a day (at bedtime), As needed, Insomnia  mesalamine 500 mg oral capsule, extended release: 1 cap(s) orally 2 times a day  metoprolol tartrate 25 mg oral tablet: 1 tab(s) orally 3 times a day  mineral oil rectal enema: 133 milliliter(s) rectal once a day, As Needed if no BM for 2-3 days   mirabegron 25 mg oral tablet, extended release: 1 tab(s) orally once a day  MiraLax oral powder for reconstitution: 17 gram(s) orally 2-3 times a day   pantoprazole 40 mg oral delayed release tablet: 1 tab(s) orally once a day (before a meal)  senna leaf extract oral tablet: 2 tab(s) orally once a day  sertraline 25 mg oral tablet: 1 tab(s) orally once a day

## 2022-11-09 NOTE — DIETITIAN NUTRITION RISK NOTIFICATION - MALNUTRITION EVALUATION AS DEMONSTRATED BY (ADULTS > 20 YEARS OF AGE)
Weight loss.../Loss of subcutaneous fat.../Loss of muscle... Weight loss.../Inadequate energy intake.../Loss of subcutaneous fat.../Loss of muscle...

## 2022-11-09 NOTE — DISCHARGE NOTE PROVIDER - CARE PROVIDER_API CALL
DR Horan,   Phone: (   )    -  Fax: (   )    -  Follow Up Time:    DR Horan,   Phone: (   )    -  Fax: (   )    -  Follow Up Time:     Jyoti Gutierrez)  Gastroenterology; Internal Medicine  [address] [place] [address]  Phone: [phone]  Fax: [phone]  Follow Up Time: 1 week   Jyoti Gutierrez)  Gastroenterology; Internal Medicine  [address] [place] [address]  Phone: [phone]  Fax: [phone]  Follow Up Time: 2 weeks    DR Horan,   Phone: (   )    -  Fax: (   )    -  Follow Up Time:

## 2022-11-09 NOTE — DIETITIAN INITIAL EVALUATION ADULT - PERTINENT LABORATORY DATA
11-08    137  |  105  |  9.2  ----------------------------<  91  4.2   |  22.0  |  0.68    Ca    9.7      08 Nov 2022 09:15    TPro  6.2<L>  /  Alb  3.4  /  TBili  0.6  /  DBili  x   /  AST  208<H>  /  ALT  >645<H>  /  AlkPhos  258<H>  11-08

## 2022-11-09 NOTE — DIETITIAN INITIAL EVALUATION ADULT - NS FNS DIET ORDER
Diet, Regular:   Supplement Feeding Modality:  Oral  Ensure Enlive Cans or Servings Per Day:  1       Frequency:  Three Times a day (11-08-22 @ 08:44)

## 2022-11-09 NOTE — DIETITIAN INITIAL EVALUATION ADULT - SIGNS/SYMPTOMS
as evidenced by wt loss of 4lbs (3%), moderate muscle wasting/fat loss	 as evidenced by likely wt loss of 4lbs (3%), mild/mod muscle/fat loss, meeting <50% EER >5d

## 2022-11-10 LAB — ANA TITR SER: NEGATIVE — SIGNIFICANT CHANGE UP

## 2022-11-10 PROCEDURE — 99233 SBSQ HOSP IP/OBS HIGH 50: CPT

## 2022-11-10 PROCEDURE — 99232 SBSQ HOSP IP/OBS MODERATE 35: CPT

## 2022-11-10 RX ORDER — SODIUM CHLORIDE 9 MG/ML
1000 INJECTION, SOLUTION INTRAVENOUS
Refills: 0 | Status: DISCONTINUED | OUTPATIENT
Start: 2022-11-10 | End: 2022-11-11

## 2022-11-10 RX ORDER — LACTULOSE 10 G/15ML
20 SOLUTION ORAL ONCE
Refills: 0 | Status: COMPLETED | OUTPATIENT
Start: 2022-11-10 | End: 2022-11-10

## 2022-11-10 RX ORDER — SOD SULF/SODIUM/NAHCO3/KCL/PEG
1000 SOLUTION, RECONSTITUTED, ORAL ORAL ONCE
Refills: 0 | Status: COMPLETED | OUTPATIENT
Start: 2022-11-10 | End: 2022-11-10

## 2022-11-10 RX ADMIN — MIRABEGRON 25 MILLIGRAM(S): 50 TABLET, EXTENDED RELEASE ORAL at 12:17

## 2022-11-10 RX ADMIN — Medication 500 MILLIGRAM(S): at 12:20

## 2022-11-10 RX ADMIN — Medication 25 MILLIGRAM(S): at 22:08

## 2022-11-10 RX ADMIN — SENNA PLUS 2 TABLET(S): 8.6 TABLET ORAL at 12:21

## 2022-11-10 RX ADMIN — PANTOPRAZOLE SODIUM 40 MILLIGRAM(S): 20 TABLET, DELAYED RELEASE ORAL at 05:21

## 2022-11-10 RX ADMIN — Medication 81 MILLIGRAM(S): at 12:19

## 2022-11-10 RX ADMIN — Medication 500 MILLIGRAM(S): at 05:15

## 2022-11-10 RX ADMIN — MEMANTINE HYDROCHLORIDE 10 MILLIGRAM(S): 10 TABLET ORAL at 05:15

## 2022-11-10 RX ADMIN — Medication 325 MILLIGRAM(S): at 12:20

## 2022-11-10 RX ADMIN — Medication 25 MILLIGRAM(S): at 13:18

## 2022-11-10 RX ADMIN — POLYETHYLENE GLYCOL 3350 17 GRAM(S): 17 POWDER, FOR SOLUTION ORAL at 05:16

## 2022-11-10 RX ADMIN — Medication 1000 MILLILITER(S): at 13:10

## 2022-11-10 RX ADMIN — SODIUM CHLORIDE 80 MILLILITER(S): 9 INJECTION, SOLUTION INTRAVENOUS at 12:17

## 2022-11-10 RX ADMIN — Medication 500 MILLIGRAM(S): at 17:59

## 2022-11-10 RX ADMIN — Medication 3 MILLIGRAM(S): at 22:08

## 2022-11-10 RX ADMIN — ENOXAPARIN SODIUM 40 MILLIGRAM(S): 100 INJECTION SUBCUTANEOUS at 12:20

## 2022-11-10 RX ADMIN — MEMANTINE HYDROCHLORIDE 10 MILLIGRAM(S): 10 TABLET ORAL at 17:56

## 2022-11-10 RX ADMIN — SERTRALINE 25 MILLIGRAM(S): 25 TABLET, FILM COATED ORAL at 12:25

## 2022-11-10 RX ADMIN — LACTULOSE 20 GRAM(S): 10 SOLUTION ORAL at 09:13

## 2022-11-10 RX ADMIN — Medication 25 MILLIGRAM(S): at 05:14

## 2022-11-10 NOTE — PROGRESS NOTE ADULT - PROBLEM SELECTOR PLAN 2
LFTs slightly downtrending . No abd pain. Pt afebrile. No leukocytosis.  - Will need OPT F/u with Gastroenterologist for EUS (unable to do MRCP due to PPM)
Improving today, BM in morning per RN    - Continue current medical regimen  - Monitor for BMs and abdominal distension
Improving today, BM in morning per RN    - Continue current medical regimen  - Monitor for BMs and abdominal distension    _________________________________________________________________  Assessment and recommendations are final when note is signed by the attending physician.
Only small BM with enema  continue BID mineral oil enema and miralax  bid

## 2022-11-10 NOTE — PROGRESS NOTE ADULT - PROBLEM SELECTOR PLAN 1
Abdominal exam is benign. Pt denies BM today. As per nursing staff, pt was having multiple loose stools.  KUB showed Nonobstructive bowel gas pattern/constipation, grossly unchanged.  - Pt received Lactulose this morning   - Will order 1L of Moviprep for today.   - Continue Miralax/Senna/Mineral oil enema as ordered   - Need regular bowel regimen at home.  - OPT F/U with her Gastroenterologist Dr. Padgett

## 2022-11-11 ENCOUNTER — TRANSCRIPTION ENCOUNTER (OUTPATIENT)
Age: 82
End: 2022-11-11

## 2022-11-11 VITALS
DIASTOLIC BLOOD PRESSURE: 61 MMHG | OXYGEN SATURATION: 98 % | HEART RATE: 79 BPM | SYSTOLIC BLOOD PRESSURE: 144 MMHG | RESPIRATION RATE: 18 BRPM

## 2022-11-11 LAB
ALBUMIN SERPL ELPH-MCNC: 3.6 G/DL — SIGNIFICANT CHANGE UP (ref 3.3–5.2)
ALP SERPL-CCNC: 201 U/L — HIGH (ref 40–120)
ALT FLD-CCNC: 393 U/L — HIGH
ANION GAP SERPL CALC-SCNC: 10 MMOL/L — SIGNIFICANT CHANGE UP (ref 5–17)
AST SERPL-CCNC: 81 U/L — HIGH
BILIRUB SERPL-MCNC: 0.3 MG/DL — LOW (ref 0.4–2)
BUN SERPL-MCNC: 14.4 MG/DL — SIGNIFICANT CHANGE UP (ref 8–20)
CALCIUM SERPL-MCNC: 9.8 MG/DL — SIGNIFICANT CHANGE UP (ref 8.4–10.5)
CHLORIDE SERPL-SCNC: 107 MMOL/L — SIGNIFICANT CHANGE UP (ref 96–108)
CO2 SERPL-SCNC: 26 MMOL/L — SIGNIFICANT CHANGE UP (ref 22–29)
CREAT SERPL-MCNC: 0.71 MG/DL — SIGNIFICANT CHANGE UP (ref 0.5–1.3)
EGFR: 86 ML/MIN/1.73M2 — SIGNIFICANT CHANGE UP
GLUCOSE SERPL-MCNC: 93 MG/DL — SIGNIFICANT CHANGE UP (ref 70–99)
MAGNESIUM SERPL-MCNC: 2.2 MG/DL — SIGNIFICANT CHANGE UP (ref 1.6–2.6)
PHOSPHATE SERPL-MCNC: 3 MG/DL — SIGNIFICANT CHANGE UP (ref 2.4–4.7)
POTASSIUM SERPL-MCNC: 4.1 MMOL/L — SIGNIFICANT CHANGE UP (ref 3.5–5.3)
POTASSIUM SERPL-SCNC: 4.1 MMOL/L — SIGNIFICANT CHANGE UP (ref 3.5–5.3)
PROT SERPL-MCNC: 6.2 G/DL — LOW (ref 6.6–8.7)
SARS-COV-2 RNA SPEC QL NAA+PROBE: SIGNIFICANT CHANGE UP
SODIUM SERPL-SCNC: 143 MMOL/L — SIGNIFICANT CHANGE UP (ref 135–145)

## 2022-11-11 PROCEDURE — 86665 EPSTEIN-BARR CAPSID VCA: CPT

## 2022-11-11 PROCEDURE — 85610 PROTHROMBIN TIME: CPT

## 2022-11-11 PROCEDURE — 82306 VITAMIN D 25 HYDROXY: CPT

## 2022-11-11 PROCEDURE — 80053 COMPREHEN METABOLIC PANEL: CPT

## 2022-11-11 PROCEDURE — 80074 ACUTE HEPATITIS PANEL: CPT

## 2022-11-11 PROCEDURE — 86381 MITOCHONDRIAL ANTIBODY EACH: CPT

## 2022-11-11 PROCEDURE — 87350 HEPATITIS BE AG IA: CPT

## 2022-11-11 PROCEDURE — 86663 EPSTEIN-BARR ANTIBODY: CPT

## 2022-11-11 PROCEDURE — 84443 ASSAY THYROID STIM HORMONE: CPT

## 2022-11-11 PROCEDURE — 80076 HEPATIC FUNCTION PANEL: CPT

## 2022-11-11 PROCEDURE — 86038 ANTINUCLEAR ANTIBODIES: CPT

## 2022-11-11 PROCEDURE — 83690 ASSAY OF LIPASE: CPT

## 2022-11-11 PROCEDURE — 80061 LIPID PANEL: CPT

## 2022-11-11 PROCEDURE — 86901 BLOOD TYPING SEROLOGIC RH(D): CPT

## 2022-11-11 PROCEDURE — 86707 HEPATITIS BE ANTIBODY: CPT

## 2022-11-11 PROCEDURE — 86850 RBC ANTIBODY SCREEN: CPT

## 2022-11-11 PROCEDURE — 84100 ASSAY OF PHOSPHORUS: CPT

## 2022-11-11 PROCEDURE — 86645 CMV ANTIBODY IGM: CPT

## 2022-11-11 PROCEDURE — 93005 ELECTROCARDIOGRAM TRACING: CPT

## 2022-11-11 PROCEDURE — 36415 COLL VENOUS BLD VENIPUNCTURE: CPT

## 2022-11-11 PROCEDURE — 87637 SARSCOV2&INF A&B&RSV AMP PRB: CPT

## 2022-11-11 PROCEDURE — 99285 EMERGENCY DEPT VISIT HI MDM: CPT | Mod: 25

## 2022-11-11 PROCEDURE — 82607 VITAMIN B-12: CPT

## 2022-11-11 PROCEDURE — 85730 THROMBOPLASTIN TIME PARTIAL: CPT

## 2022-11-11 PROCEDURE — U0003: CPT

## 2022-11-11 PROCEDURE — 71045 X-RAY EXAM CHEST 1 VIEW: CPT

## 2022-11-11 PROCEDURE — 86255 FLUORESCENT ANTIBODY SCREEN: CPT

## 2022-11-11 PROCEDURE — U0005: CPT

## 2022-11-11 PROCEDURE — 85025 COMPLETE CBC W/AUTO DIFF WBC: CPT

## 2022-11-11 PROCEDURE — 83605 ASSAY OF LACTIC ACID: CPT

## 2022-11-11 PROCEDURE — 84478 ASSAY OF TRIGLYCERIDES: CPT

## 2022-11-11 PROCEDURE — 74018 RADEX ABDOMEN 1 VIEW: CPT

## 2022-11-11 PROCEDURE — 86900 BLOOD TYPING SEROLOGIC ABO: CPT

## 2022-11-11 PROCEDURE — 84484 ASSAY OF TROPONIN QUANT: CPT

## 2022-11-11 PROCEDURE — 80048 BASIC METABOLIC PNL TOTAL CA: CPT

## 2022-11-11 PROCEDURE — 86664 EPSTEIN-BARR NUCLEAR ANTIGEN: CPT

## 2022-11-11 PROCEDURE — 76705 ECHO EXAM OF ABDOMEN: CPT

## 2022-11-11 PROCEDURE — 99239 HOSP IP/OBS DSCHRG MGMT >30: CPT

## 2022-11-11 PROCEDURE — 99233 SBSQ HOSP IP/OBS HIGH 50: CPT | Mod: FS

## 2022-11-11 PROCEDURE — 74177 CT ABD & PELVIS W/CONTRAST: CPT | Mod: MA

## 2022-11-11 PROCEDURE — 83735 ASSAY OF MAGNESIUM: CPT

## 2022-11-11 RX ORDER — POLYETHYLENE GLYCOL 3350 17 G/17G
17 POWDER, FOR SOLUTION ORAL
Qty: 1020 | Refills: 0
Start: 2022-11-11 | End: 2022-12-10

## 2022-11-11 RX ORDER — POLYETHYLENE GLYCOL 3350 17 G/17G
17 POWDER, FOR SOLUTION ORAL ONCE
Refills: 0 | Status: DISCONTINUED | OUTPATIENT
Start: 2022-11-11 | End: 2022-11-11

## 2022-11-11 RX ORDER — SENNA PLUS 8.6 MG/1
2 TABLET ORAL
Qty: 0 | Refills: 0 | DISCHARGE
Start: 2022-11-11

## 2022-11-11 RX ORDER — MINERAL OIL
133 OIL (ML) MISCELLANEOUS
Qty: 0 | Refills: 0 | DISCHARGE
Start: 2022-11-11

## 2022-11-11 RX ADMIN — POLYETHYLENE GLYCOL 3350 17 GRAM(S): 17 POWDER, FOR SOLUTION ORAL at 05:59

## 2022-11-11 RX ADMIN — PANTOPRAZOLE SODIUM 40 MILLIGRAM(S): 20 TABLET, DELAYED RELEASE ORAL at 06:03

## 2022-11-11 RX ADMIN — Medication 25 MILLIGRAM(S): at 14:47

## 2022-11-11 RX ADMIN — Medication 500 MILLIGRAM(S): at 12:41

## 2022-11-11 RX ADMIN — MEMANTINE HYDROCHLORIDE 10 MILLIGRAM(S): 10 TABLET ORAL at 17:11

## 2022-11-11 RX ADMIN — Medication 500 MILLIGRAM(S): at 17:12

## 2022-11-11 RX ADMIN — Medication 325 MILLIGRAM(S): at 12:43

## 2022-11-11 RX ADMIN — Medication 500 MILLIGRAM(S): at 05:59

## 2022-11-11 RX ADMIN — ENOXAPARIN SODIUM 40 MILLIGRAM(S): 100 INJECTION SUBCUTANEOUS at 12:39

## 2022-11-11 RX ADMIN — SODIUM CHLORIDE 80 MILLILITER(S): 9 INJECTION, SOLUTION INTRAVENOUS at 06:03

## 2022-11-11 RX ADMIN — Medication 81 MILLIGRAM(S): at 12:40

## 2022-11-11 RX ADMIN — Medication 25 MILLIGRAM(S): at 05:59

## 2022-11-11 RX ADMIN — MIRABEGRON 25 MILLIGRAM(S): 50 TABLET, EXTENDED RELEASE ORAL at 11:57

## 2022-11-11 RX ADMIN — SERTRALINE 25 MILLIGRAM(S): 25 TABLET, FILM COATED ORAL at 12:42

## 2022-11-11 RX ADMIN — MEMANTINE HYDROCHLORIDE 10 MILLIGRAM(S): 10 TABLET ORAL at 05:59

## 2022-11-11 RX ADMIN — SENNA PLUS 2 TABLET(S): 8.6 TABLET ORAL at 12:42

## 2022-11-11 NOTE — PROGRESS NOTE ADULT - SUBJECTIVE AND OBJECTIVE BOX
Patient is a 81y old  Female who presents with a chief complaint of Pancreatitis v Constipation/Stercoral Colitis (05 Nov 2022 15:19)      HPI:  81F with PMHX Alzheimer's Dementia, Aortic Valve replacement, PPM ([facility] 2016), MDD, HTN, HLD, Ulcerative Colitis, Chronic Constipation, GERD presents to [facility] ER c/o acute onset epigastric abdominal pain which started after eating large Pancake breakfast. Patient awake , alert and oriented this am.  Sister at bedside. Sister states pt was agitated overnight and given meds for agitation which made her disoriented. Appears to be back to baseline as pt sister. Pt denies abdominal pain. Had only small Bm with enema as per nurse . LFT improving. No fevers.       ROS negative unless mentioned.     PMHX: Alzheimer's Dementia, Aortic Valve replacement, PPM ([facility] 2016), HTN, HLD, Ulcerative Colitis, Chronic Constipation, GERD, MDD  PSHX: ?Cholecystectomy, PPM placement, AVR  FamHx: Denies fam hx HTN  Social Hx: Denies etoh/tobacco/drug abuse  NKDA (05 Nov 2022 04:40)      REVIEW OF SYSTEMS:  Constitutional: No fever, weight loss or fatigue  ENMT:  No difficulty hearing, tinnitus, vertigo; No sinus or throat pain  Respiratory: No cough, wheezing, chills or hemoptysis  Cardiovascular: No chest pain, palpitations, dizziness or leg swelling  Gastrointestinal: No abdominal or epigastric pain. No nausea, vomiting or hematemesis; No diarrhea or constipation. No melena or hematochezia.  Skin: No itching, burning, rashes or lesions   Musculoskeletal: No joint pain or swelling; No muscle, back or extremity pain    PAST MEDICAL & SURGICAL HISTORY:  Cardiac pacemaker      HTN (hypertension)      HLD (hyperlipidemia)      S/P aortic valve replacement          FAMILY HISTORY:      SOCIAL HISTORY:  Smoking Status: [ ] Current, [ ] Former, [ ] Never  Pack Years:  [  ] EtOH-no  [  ] IVDA    MEDICATIONS:  MEDICATIONS  (STANDING):  amLODIPine   Tablet 5 milliGRAM(s) Oral daily  ascorbic acid 500 milliGRAM(s) Oral daily  aspirin  chewable 81 milliGRAM(s) Oral daily  atorvastatin 40 milliGRAM(s) Oral at bedtime  enoxaparin Injectable 40 milliGRAM(s) SubCutaneous every 24 hours  ferrous    sulfate 325 milliGRAM(s) Oral daily  lactated ringers. 1000 milliLiter(s) (125 mL/Hr) IV Continuous <Continuous>  memantine 10 milliGRAM(s) Oral two times a day  mesalamine ER Capsule 500 milliGRAM(s) Oral two times a day  metoprolol tartrate 25 milliGRAM(s) Oral three times a day  mineral oil enema 133 milliLiter(s) Rectal two times a day  mirabegron ER 25 milliGRAM(s) Oral <User Schedule>  OLANZapine Injectable 5 milliGRAM(s) IntraMuscular once  pantoprazole    Tablet 40 milliGRAM(s) Oral before breakfast  piperacillin/tazobactam IVPB.. 3.375 Gram(s) IV Intermittent every 8 hours  polyethylene glycol 3350 17 Gram(s) Oral two times a day  senna 2 Tablet(s) Oral daily  sertraline 25 milliGRAM(s) Oral daily    MEDICATIONS  (PRN):  acetaminophen     Tablet .. 650 milliGRAM(s) Oral every 6 hours PRN Temp greater or equal to 38C (100.4F), Mild Pain (1 - 3)  aluminum hydroxide/magnesium hydroxide/simethicone Suspension 30 milliLiter(s) Oral every 4 hours PRN Dyspepsia  melatonin 3 milliGRAM(s) Oral at bedtime PRN Insomnia  ondansetron Injectable 4 milliGRAM(s) IV Push every 8 hours PRN Nausea and/or Vomiting      Allergies    No Known Allergies    Intolerances        Vital Signs Last 24 Hrs  T(C): 36.3 (06 Nov 2022 04:46), Max: 37.4 (05 Nov 2022 14:54)  T(F): 97.4 (06 Nov 2022 04:46), Max: 99.3 (05 Nov 2022 14:54)  HR: 72 (06 Nov 2022 04:46) (64 - 72)  BP: 180/80 (06 Nov 2022 04:46) (149/76 - 185/77)  BP(mean): 70 (05 Nov 2022 23:15) (70 - 70)  RR: 18 (06 Nov 2022 04:46) (18 - 18)  SpO2: 100% (06 Nov 2022 04:46) (97% - 100%)    Parameters below as of 06 Nov 2022 04:46  Patient On (Oxygen Delivery Method): room air        11-05 @ 08:01  -  11-06 @ 07:00  --------------------------------------------------------  IN: 711 mL / OUT: 1050 mL / NET: -339 mL          PHYSICAL EXAM:    General: ; in no acute distress  HEENT: MMM, conjunctiva and sclera clear  H-RRR  L-CTA  Gastrointestinal: Soft, non-tender non-distended; Normal bowel sounds; No rebound or guarding  Extremities: Normal range of motion, No clubbing, cyanosis or edema  Neurological: Alert and oriented x3- slow to answer , but oriented   Skin: Warm and dry. No obvious rash      LABS:                        11.7   6.99  )-----------( 163      ( 06 Nov 2022 02:48 )             34.9     06 Nov 2022 02:48    140    |  104    |  10.0   ----------------------------<  103    3.5     |  23.0   |  0.65     Ca    10.2       06 Nov 2022 02:48  Phos  3.2       05 Nov 2022 07:56  Mg     2.3       05 Nov 2022 07:56    TPro  6.5    /  Alb  4.1    /  TBili  1.1    /  DBili  x      /  AST  1594   /  ALT  2248   /  AlkPhos  353    / Amylase x      /Lipase x      06 Nov 2022 02:48              RADIOLOGY & ADDITIONAL STUDIES:     < from: CT Abdomen and Pelvis w/ IV Cont (11.04.22 @ 22:39) >  oderately large fecal burden. Distended rectum with mild wall thickening   and perirectal fat haziness suggesting early stercoral colitis.    --- End of Report ---          < end of copied text >  
Pt is seen this am at the bedside   and later around lunch  , sitting in the chair at the bedside , son present   The son updated ,all questions answered     1:1 discontinued this am   Pt si awake confused , denies abd pain   diet advanced tolerating     Vital Signs Last 24 Hrs  T(C): 36.6 (08 Nov 2022 13:30), Max: 36.8 (08 Nov 2022 04:18)  T(F): 97.8 (08 Nov 2022 13:30), Max: 98.3 (08 Nov 2022 04:18)  HR: 70 (08 Nov 2022 13:30) (64 - 70)  BP: 120/71 (08 Nov 2022 13:30) (114/56 - 177/81)  BP(mean): --  RR: 18 (08 Nov 2022 13:30) (16 - 18)  SpO2: 95% (08 Nov 2022 13:30) (95% - 97%)    Parameters below as of 08 Nov 2022 13:30  Patient On (Oxygen Delivery Method): room air          Constitutional: NAD, Resting in the bed   Lungs: CTA bilateral  Non-labored breathing  Cardio: RRR, S1/S2, No murmur  Abdomen: Soft, Nontender, Nondistended; Bowel sounds present  Extremities: No calf tenderness, No pitting edema  Psych: Calm, cooperative affect appropriate  Neuro: Awake , confused pleasant , follow simple commands              LIVER FUNCTIONS - ( 08 Nov 2022 09:15 )  Alb: 3.4 g/dL / Pro: 6.2 g/dL / ALK PHOS: 258 U/L / ALT: >645 U/L / AST: 208 U/L / GGT: x         11-08    137  |  105  |  9.2  ----------------------------<  91  4.2   |  22.0  |  0.68    Ca    9.7      08 Nov 2022 09:15  Phos  3.2     11-07    TPro  6.2<L>  /  Alb  3.4  /  TBili  0.6  /  DBili  x   /  AST  208<H>  /  ALT  >645<H>  /  AlkPhos  258<H>  11-08  
    Chief Complaint:  Patient is a 81y old  Female who presents with a chief complaint of Pancreatitis v Constipation/Stercoral Colitis (09 Nov 2022 09:57)      HPI/ 24 hr events: Patient seen and examined at bedside, no overnight events. Pt feeling well this morning. Tolerating diet. Vitals are stable, no leukocytosis, Hemoglobin stable at 10.6gm, No CMP today, LFTs downtrending yesterday. Denies nausea, vomiting, abdominal pain, hematemesis, hematochezia, melena.      REVIEW OF SYSTEMS:   General: Negative  HEENT: Negative  CV: Negative  Respiratory: Negative  GI: See HPI  : Negative  MSK: Negative  Hematologic: Negative  Skin: Negative    MEDICATIONS:   MEDICATIONS  (STANDING):  amLODIPine   Tablet 5 milliGRAM(s) Oral daily  ascorbic acid 500 milliGRAM(s) Oral daily  aspirin  chewable 81 milliGRAM(s) Oral daily  enoxaparin Injectable 40 milliGRAM(s) SubCutaneous every 24 hours  ferrous    sulfate 325 milliGRAM(s) Oral daily  memantine 10 milliGRAM(s) Oral two times a day  mesalamine ER Capsule 500 milliGRAM(s) Oral two times a day  metoprolol tartrate 25 milliGRAM(s) Oral three times a day  mineral oil enema 133 milliLiter(s) Rectal two times a day  mirabegron ER 25 milliGRAM(s) Oral <User Schedule>  OLANZapine Injectable 5 milliGRAM(s) IntraMuscular once  pantoprazole    Tablet 40 milliGRAM(s) Oral before breakfast  polyethylene glycol 3350 17 Gram(s) Oral two times a day  senna 2 Tablet(s) Oral daily  sertraline 25 milliGRAM(s) Oral daily    MEDICATIONS  (PRN):  acetaminophen     Tablet .. 650 milliGRAM(s) Oral every 6 hours PRN Temp greater or equal to 38C (100.4F), Mild Pain (1 - 3)  aluminum hydroxide/magnesium hydroxide/simethicone Suspension 30 milliLiter(s) Oral every 4 hours PRN Dyspepsia  melatonin 3 milliGRAM(s) Oral at bedtime PRN Insomnia  ondansetron Injectable 4 milliGRAM(s) IV Push every 8 hours PRN Nausea and/or Vomiting      ALLERGIES:   Allergies    No Known Allergies    Intolerances        VITAL SIGNS:   Vital Signs Last 24 Hrs  T(C): 36.9 (09 Nov 2022 04:24), Max: 36.9 (08 Nov 2022 21:13)  T(F): 98.5 (09 Nov 2022 04:24), Max: 98.5 (08 Nov 2022 21:13)  HR: 65 (09 Nov 2022 04:24) (65 - 71)  BP: 107/63 (09 Nov 2022 04:24) (107/63 - 137/74)  BP(mean): --  RR: 20 (09 Nov 2022 04:24) (18 - 20)  SpO2: 98% (09 Nov 2022 04:24) (95% - 98%)    Parameters below as of 09 Nov 2022 04:24  Patient On (Oxygen Delivery Method): room air      I&O's Summary    08 Nov 2022 07:01  -  09 Nov 2022 07:00  --------------------------------------------------------  IN: 845 mL / OUT: 0 mL / NET: 845 mL        PHYSICAL EXAM:   GENERAL:  No acute distress  HEENT:  NC/AT, conjunctiva clear, sclera anicteric  CHEST:  No increased effort, breath sounds clear  HEART:  Regular rhythm, S1, S2,   ABDOMEN:  Soft, non-tender, non-distended, normoactive bowel sounds, no rebound or guarding  EXTREMITIES: No edema  SKIN:  Warm, dry  NEURO:  Calm, cooperative    LABS:  CBC Full  -  ( 09 Nov 2022 09:33 )  WBC Count : 7.21 K/uL  RBC Count : 3.62 M/uL  Hemoglobin : 10.6 g/dL  Hematocrit : 33.0 %  Platelet Count - Automated : 183 K/uL  Mean Cell Volume : 91.2 fl  Mean Cell Hemoglobin : 29.3 pg  Mean Cell Hemoglobin Concentration : 32.1 gm/dL  Auto Neutrophil # : 5.54 K/uL  Auto Lymphocyte # : 1.02 K/uL  Auto Monocyte # : 0.41 K/uL  Auto Eosinophil # : 0.17 K/uL  Auto Basophil # : 0.05 K/uL  Auto Neutrophil % : 76.8 %  Auto Lymphocyte % : 14.1 %  Auto Monocyte % : 5.7 %  Auto Eosinophil % : 2.4 %  Auto Basophil % : 0.7 %    11-08    137  |  105  |  9.2  ----------------------------<  91  4.2   |  22.0  |  0.68    Ca    9.7      08 Nov 2022 09:15    TPro  6.2<L>  /  Alb  3.4  /  TBili  0.6  /  DBili  x   /  AST  208<H>  /  ALT  >645<H>  /  AlkPhos  258<H>  11-08    LIVER FUNCTIONS - ( 08 Nov 2022 09:15 )  Alb: 3.4 g/dL / Pro: 6.2 g/dL / ALK PHOS: 258 U/L / ALT: >645 U/L / AST: 208 U/L / GGT: x                         Hepatitis Be Antibody: Nonreact (11-08-22 @ 09:15)  Hepatitis Be Antigen: Nonreact (11-08-22 @ 09:15)  Hepatitis B Surface Antigen: Nonreact (11-08-22 @ 09:15)  Hepatitis C Virus S/CO Ratio: 0.06 S/CO (11-08-22 @ 09:15)  Hepatitis A IgM Antibody: Nonreact (11-08-22 @ 09:15)  Hepatitis B Core IgM Antibody: Nonreact (11-08-22 @ 09:15)      RADIOLOGY & ADDITIONAL STUDIES:        
Hospitalist Daily Progress Note    Chief Complaint:  Patient is a 81y old  Female who presents with a chief complaint of Pancreatitis v Constipation/Stercoral Colitis (05 Nov 2022 13:25)      SUBJECTIVE / OVERNIGHT EVENTS:  Patient was seen and examined at bedside.   Patient denies chest pain, SOB, abd pain, N/V, fever, chills, dysuria or any other complaints. All remainder ROS negative.     MEDICATIONS  (STANDING):  atorvastatin 40 milliGRAM(s) Oral at bedtime  enoxaparin Injectable 40 milliGRAM(s) SubCutaneous every 24 hours  lactated ringers. 1000 milliLiter(s) (125 mL/Hr) IV Continuous <Continuous>  mesalamine ER Capsule 500 milliGRAM(s) Oral two times a day  metoprolol tartrate 25 milliGRAM(s) Oral three times a day  mineral oil enema 133 milliLiter(s) Rectal two times a day  pantoprazole    Tablet 40 milliGRAM(s) Oral before breakfast  piperacillin/tazobactam IVPB.. 3.375 Gram(s) IV Intermittent every 8 hours  polyethylene glycol 3350 17 Gram(s) Oral daily  polyethylene glycol 3350 17 Gram(s) Oral two times a day  senna 2 Tablet(s) Oral daily  sertraline 25 milliGRAM(s) Oral daily    MEDICATIONS  (PRN):  acetaminophen     Tablet .. 650 milliGRAM(s) Oral every 6 hours PRN Temp greater or equal to 38C (100.4F), Mild Pain (1 - 3)  aluminum hydroxide/magnesium hydroxide/simethicone Suspension 30 milliLiter(s) Oral every 4 hours PRN Dyspepsia  melatonin 3 milliGRAM(s) Oral at bedtime PRN Insomnia  ondansetron Injectable 4 milliGRAM(s) IV Push every 8 hours PRN Nausea and/or Vomiting        I&O's Summary      PHYSICAL EXAM:  Vital Signs Last 24 Hrs  T(C): 37.4 (05 Nov 2022 14:54), Max: 37.4 (05 Nov 2022 14:54)  T(F): 99.3 (05 Nov 2022 14:54), Max: 99.3 (05 Nov 2022 14:54)  HR: 65 (05 Nov 2022 14:54) (64 - 70)  BP: 185/77 (05 Nov 2022 14:54) (143/66 - 198/80)  BP(mean): 107 (05 Nov 2022 05:41) (107 - 107)  RR: 18 (05 Nov 2022 14:54) (15 - 18)  SpO2: 98% (05 Nov 2022 14:54) (98% - 100%)    Parameters below as of 05 Nov 2022 14:54  Patient On (Oxygen Delivery Method): room air          Constitutional: NAD, Resting  ENT: Supple, No JVD  Lungs: CTA B/L, Non-labored breathing  Cardio: RRR, S1/S2, No murmur  Abdomen: Soft, Nontender, Nondistended; Bowel sounds present  Extremities: No calf tenderness, No pitting edema  Musculoskeletal:   No clubbing or cyanosis of digits; no joint swelling or tenderness to palpation  Psych: Calm, cooperative affect appropriate  Neuro: Awake and alert, moves all extremities  Skin: No rashes; no palpable lesions    LABS:                        11.4   10.98 )-----------( 163      ( 05 Nov 2022 07:56 )             33.6     11-05    139  |  103  |  14.6  ----------------------------<  120<H>  3.9   |  27.0  |  0.78    Ca    9.9      05 Nov 2022 07:56  Phos  3.2     11-05  Mg     2.3     11-05    TPro  6.4<L>  /  Alb  4.0  /  TBili  1.8  /  DBili  x   /  AST  4034<H>  /  ALT  3477<H>  /  AlkPhos  370<H>  11-05    PT/INR - ( 04 Nov 2022 19:17 )   PT: 12.2 sec;   INR: 1.05 ratio         PTT - ( 04 Nov 2022 19:17 )  PTT:25.9 sec  CARDIAC MARKERS ( 04 Nov 2022 14:06 )  x     / <0.01 ng/mL / x     / x     / x              CAPILLARY BLOOD GLUCOSE            RADIOLOGY REVIEWED  
Pt is seen this am at the bedside     feeling well   having BM per nurses   no abd pain , no nausea     Vital Signs Last 24 Hrs  T(C): 36.7 (09 Nov 2022 16:29), Max: 36.9 (08 Nov 2022 21:13)  T(F): 98.1 (09 Nov 2022 16:29), Max: 98.5 (08 Nov 2022 21:13)  HR: 66 (09 Nov 2022 16:29) (65 - 70)  BP: 130/66 (09 Nov 2022 16:29) (107/63 - 134/72)  BP(mean): --  RR: 18 (09 Nov 2022 16:29) (18 - 20)  SpO2: 100% (09 Nov 2022 16:29) (98% - 100%)    Parameters below as of 09 Nov 2022 16:29  Patient On (Oxygen Delivery Method): room air          Constitutional: NAD, Resting in the bed   Lungs: CTA bilateral  Non-labored breathing  Cardio: RRR, S1/S2, No murmur  Abdomen: Soft, Nontender, Nondistended; Bowel sounds present  Extremities: No calf tenderness, No pitting edema  Psych: Calm, cooperative affect appropriate  Neuro: Awake , confused pleasant , follow simple commands                           10.6   7.21  )-----------( 183      ( 09 Nov 2022 09:33 )             33.0   11-09    140  |  105  |  21.3<H>  ----------------------------<  151<H>  4.5   |  26.0  |  0.80    Ca    9.5      09 Nov 2022 09:33    TPro  6.0<L>  /  Alb  3.4  /  TBili  0.2<L>  /  DBili  0.1  /  AST  106<H>  /  ALT  580<H>  /  AlkPhos  216<H>  11-09  
    Chief Complaint:  Patient is a 81y old  Female who presents with a chief complaint of Pancreatitis v Constipation/Stercoral Colitis (10 Nov 2022 17:30)      HPI/ 24 hr events: Patient seen and examined at bedside, no overnight events. Pleasantly confused, with no complaints this AM. Stating she wants to go home. Tolerating diet. Vitals are stable. 1 BM reported yesterday morning. Pt completed 1L of Moviprep yesterday afternoon with no reported BM afterwards. Denies nausea, vomiting, abdominal pain.       REVIEW OF SYSTEMS:   General: Negative  HEENT: Negative  CV: Negative  Respiratory: Negative  GI: See HPI  : Negative  MSK: Negative  Hematologic: Negative  Skin: Negative    MEDICATIONS:   MEDICATIONS  (STANDING):  ascorbic acid 500 milliGRAM(s) Oral daily  aspirin  chewable 81 milliGRAM(s) Oral daily  enoxaparin Injectable 40 milliGRAM(s) SubCutaneous every 24 hours  ferrous    sulfate 325 milliGRAM(s) Oral daily  lactated ringers. 1000 milliLiter(s) (80 mL/Hr) IV Continuous <Continuous>  memantine 10 milliGRAM(s) Oral two times a day  mesalamine ER Capsule 500 milliGRAM(s) Oral two times a day  metoprolol tartrate 25 milliGRAM(s) Oral three times a day  mineral oil enema 133 milliLiter(s) Rectal two times a day  mirabegron ER 25 milliGRAM(s) Oral <User Schedule>  OLANZapine Injectable 5 milliGRAM(s) IntraMuscular once  pantoprazole    Tablet 40 milliGRAM(s) Oral before breakfast  polyethylene glycol 3350 17 Gram(s) Oral two times a day  senna 2 Tablet(s) Oral daily  sertraline 25 milliGRAM(s) Oral daily    MEDICATIONS  (PRN):  acetaminophen     Tablet .. 650 milliGRAM(s) Oral every 6 hours PRN Temp greater or equal to 38C (100.4F), Mild Pain (1 - 3)  aluminum hydroxide/magnesium hydroxide/simethicone Suspension 30 milliLiter(s) Oral every 4 hours PRN Dyspepsia  melatonin 3 milliGRAM(s) Oral at bedtime PRN Insomnia  ondansetron Injectable 4 milliGRAM(s) IV Push every 8 hours PRN Nausea and/or Vomiting      ALLERGIES:   Allergies    No Known Allergies    Intolerances        VITAL SIGNS:   Vital Signs Last 24 Hrs  T(C): 36.6 (11 Nov 2022 05:08), Max: 37.2 (10 Nov 2022 16:33)  T(F): 97.8 (11 Nov 2022 05:08), Max: 98.9 (10 Nov 2022 16:33)  HR: 64 (11 Nov 2022 05:08) (64 - 75)  BP: 130/75 (11 Nov 2022 05:08) (130/75 - 163/84)  BP(mean): --  RR: 18 (11 Nov 2022 05:08) (18 - 18)  SpO2: 98% (11 Nov 2022 05:08) (95% - 99%)    Parameters below as of 10 Nov 2022 22:06  Patient On (Oxygen Delivery Method): room air      I&O's Summary    10 Nov 2022 07:01  -  11 Nov 2022 07:00  --------------------------------------------------------  IN: 1280 mL / OUT: 0 mL / NET: 1280 mL    11 Nov 2022 07:01  -  11 Nov 2022 08:49  --------------------------------------------------------  IN: 320 mL / OUT: 0 mL / NET: 320 mL        PHYSICAL EXAM:   GENERAL: Elderly female, pleasantly confused, No acute distress  HEENT:  NC/AT, conjunctiva clear, sclera anicteric  CHEST:  No increased effort, breath sounds clear  HEART:  Regular rhythm, S1, S2,   ABDOMEN:  Soft, non-tender, non-distended, normoactive bowel sounds, no rebound or guarding  EXTREMITIES: No edema  SKIN:  Warm, dry  NEURO:  Calm, cooperative    LABS:  CBC Full  -  ( 09 Nov 2022 09:33 )  WBC Count : 7.21 K/uL  RBC Count : 3.62 M/uL  Hemoglobin : 10.6 g/dL  Hematocrit : 33.0 %  Platelet Count - Automated : 183 K/uL  Mean Cell Volume : 91.2 fl  Mean Cell Hemoglobin : 29.3 pg  Mean Cell Hemoglobin Concentration : 32.1 gm/dL  Auto Neutrophil # : 5.54 K/uL  Auto Lymphocyte # : 1.02 K/uL  Auto Monocyte # : 0.41 K/uL  Auto Eosinophil # : 0.17 K/uL  Auto Basophil # : 0.05 K/uL  Auto Neutrophil % : 76.8 %  Auto Lymphocyte % : 14.1 %  Auto Monocyte % : 5.7 %  Auto Eosinophil % : 2.4 %  Auto Basophil % : 0.7 %    11-11    143  |  107  |  14.4  ----------------------------<  93  4.1   |  26.0  |  0.71    Ca    9.8      11 Nov 2022 06:37  Phos  3.0     11-11  Mg     2.2     11-11    TPro  6.2<L>  /  Alb  3.6  /  TBili  0.3<L>  /  DBili  x   /  AST  81<H>  /  ALT  393<H>  /  AlkPhos  201<H>  11-11    LIVER FUNCTIONS - ( 11 Nov 2022 06:37 )  Alb: 3.6 g/dL / Pro: 6.2 g/dL / ALK PHOS: 201 U/L / ALT: 393 U/L / AST: 81 U/L / GGT: x                 Hepatitis Be Antibody: Nonreact (11-08-22 @ 09:15)  Hepatitis Be Antigen: Nonreact (11-08-22 @ 09:15)  Hepatitis B Surface Antigen: Nonreact (11-08-22 @ 09:15)  Hepatitis C Virus S/CO Ratio: 0.06 S/CO (11-08-22 @ 09:15)  Hepatitis A IgM Antibody: Nonreact (11-08-22 @ 09:15)  Hepatitis B Core IgM Antibody: Nonreact (11-08-22 @ 09:15)      RADIOLOGY & ADDITIONAL STUDIES:        < from: Xray Kidney Ureter Bladder (11.09.22 @ 15:52) >    ACC: [id number] EXAM:  XR KUB 1 VIEW                          PROCEDURE DATE:  11/09/2022          INTERPRETATION:  Clinical history: 81-year-old female, rule out   obstruction.    Supine view of the abdomen is correlated with the abdominal CT of   11/4/2022.    FINDINGS: Nonobstructive bowel gas pattern/constipation with redundant   sigmoid colon, unchanged.    No pneumoperitoneum or acute osseous finding. Post cholecystectomy.    IMPRESSION:  Nonobstructive bowel gas pattern/constipation, grossly unchanged    --- End of Report ---            TAPAN FERNANDEZ DO; Attending Radiologist  This document has been electronically signed. Nov 9 2022  4:14PM    < end of copied text >    
Pt is seen this am at the bedside   on 1:1   Pt is confused to place , time events   looks comfortable, lying in the bed     denies abd pain , no nausea , no BM per nursing this am during my evaluation         MEDICATIONS  (STANDING):  amLODIPine   Tablet 5 milliGRAM(s) Oral daily  ascorbic acid 500 milliGRAM(s) Oral daily  aspirin  chewable 81 milliGRAM(s) Oral daily  atorvastatin 40 milliGRAM(s) Oral at bedtime  enoxaparin Injectable 40 milliGRAM(s) SubCutaneous every 24 hours  ferrous    sulfate 325 milliGRAM(s) Oral daily  lactated ringers. 1000 milliLiter(s) (75 mL/Hr) IV Continuous <Continuous>  lactulose Syrup 20 Gram(s) Oral once  memantine 10 milliGRAM(s) Oral two times a day  mesalamine ER Capsule 500 milliGRAM(s) Oral two times a day  metoprolol tartrate 25 milliGRAM(s) Oral three times a day  mineral oil enema 133 milliLiter(s) Rectal two times a day  mirabegron ER 25 milliGRAM(s) Oral <User Schedule>  OLANZapine Injectable 5 milliGRAM(s) IntraMuscular once  pantoprazole    Tablet 40 milliGRAM(s) Oral before breakfast  piperacillin/tazobactam IVPB.. 3.375 Gram(s) IV Intermittent every 8 hours  polyethylene glycol 3350 17 Gram(s) Oral two times a day  potassium chloride   Powder 40 milliEquivalent(s) Oral once  senna 2 Tablet(s) Oral daily  sertraline 25 milliGRAM(s) Oral daily    Vital Signs Last 24 Hrs  T(C): 36.4 (07 Nov 2022 04:00), Max: 36.7 (06 Nov 2022 21:21)  T(F): 97.5 (07 Nov 2022 04:00), Max: 98 (06 Nov 2022 21:21)  HR: 65 (07 Nov 2022 04:00) (65 - 65)  BP: 120/64 (07 Nov 2022 04:00) (120/64 - 166/72)  BP(mean): 91 (06 Nov 2022 16:16) (91 - 91)  RR: 18 (07 Nov 2022 04:00) (16 - 18)  SpO2: 99% (07 Nov 2022 04:00) (98% - 100%)    Parameters below as of 07 Nov 2022 04:00  Patient On (Oxygen Delivery Method): room air      Constitutional: NAD, Resting in the bed   Lungs: CTA bilateral  Non-labored breathing  Cardio: RRR, S1/S2, No murmur  Abdomen: Soft, Nontender, Nondistended; Bowel sounds present  Extremities: No calf tenderness, No pitting edema  Psych: Calm, cooperative affect appropriate  Neuro: Awake , confused pleasant , follow simple commands                             11.9   7.54  )-----------( 156      ( 07 Nov 2022 06:44 )             36.0     11-07    142  |  107  |  11.4  ----------------------------<  106<H>  3.5   |  23.0  |  0.67    Ca    9.9      07 Nov 2022 06:44  Phos  3.2     11-07    TPro  6.3<L>  /  Alb  3.8  /  TBili  0.6  /  DBili  x   /  AST  504<H>  /  ALT  1380<H>  /  AlkPhos  304<H>  11-07  
Pt is seen this am at the bedside in am   [facility] in am to reevaluate       KUB is showing constipation   son is concerned taking her home without complete constipation resolution       Vital Signs Last 24 Hrs  T(C): 37.2 (10 Nov 2022 16:33), Max: 37.2 (10 Nov 2022 16:33)  T(F): 98.9 (10 Nov 2022 16:33), Max: 98.9 (10 Nov 2022 16:33)  HR: 66 (10 Nov 2022 16:33) (65 - 66)  BP: 137/78 (10 Nov 2022 16:33) (117/70 - 163/84)  BP(mean): --  RR: 18 (10 Nov 2022 16:33) (18 - 18)  SpO2: 95% (10 Nov 2022 16:33) (95% - 99%)    Parameters below as of 10 Nov 2022 11:30  Patient On (Oxygen Delivery Method): room air            Constitutional: NAD, Resting in the bed   Lungs: CTA bilateral  Non-labored breathing  Cardio: RRR, S1/S2, No murmur  Abdomen: Soft, Nontender, Nondistended; Bowel sounds present  Extremities: No calf tenderness, No pitting edema  Psych: Calm, cooperative affect appropriate  Neuro: Awake , confused pleasant , follow simple commands                           10.6   7.21  )-----------( 183      ( 09 Nov 2022 09:33 )             33.0   11-09    140  |  105  |  21.3<H>  ----------------------------<  151<H>  4.5   |  26.0  |  0.80    Ca    9.5      09 Nov 2022 09:33    TPro  6.0<L>  /  Alb  3.4  /  TBili  0.2<L>  /  DBili  0.1  /  AST  106<H>  /  ALT  580<H>  /  AlkPhos  216<H>  11-09  
Hospitalist Daily Progress Note    Chief Complaint:  Patient is a 81y old  Female who presents with a chief complaint of Pancreatitis v Constipation/Stercoral Colitis (06 Nov 2022 08:38)      SUBJECTIVE / OVERNIGHT EVENTS:  Patient was seen and examined at bedside. No reported bowel movements.  Patient denies chest pain, SOB, abd pain, N/V, fever, chills, dysuria or any other complaints. All remainder ROS negative.     MEDICATIONS  (STANDING):  amLODIPine   Tablet 5 milliGRAM(s) Oral daily  ascorbic acid 500 milliGRAM(s) Oral daily  aspirin  chewable 81 milliGRAM(s) Oral daily  atorvastatin 40 milliGRAM(s) Oral at bedtime  enoxaparin Injectable 40 milliGRAM(s) SubCutaneous every 24 hours  ferrous    sulfate 325 milliGRAM(s) Oral daily  lactated ringers. 1000 milliLiter(s) (125 mL/Hr) IV Continuous <Continuous>  memantine 10 milliGRAM(s) Oral two times a day  mesalamine ER Capsule 500 milliGRAM(s) Oral two times a day  metoprolol tartrate 25 milliGRAM(s) Oral three times a day  mineral oil enema 133 milliLiter(s) Rectal two times a day  mirabegron ER 25 milliGRAM(s) Oral <User Schedule>  OLANZapine Injectable 5 milliGRAM(s) IntraMuscular once  pantoprazole    Tablet 40 milliGRAM(s) Oral before breakfast  piperacillin/tazobactam IVPB.. 3.375 Gram(s) IV Intermittent every 8 hours  polyethylene glycol 3350 17 Gram(s) Oral two times a day  senna 2 Tablet(s) Oral daily  sertraline 25 milliGRAM(s) Oral daily    MEDICATIONS  (PRN):  acetaminophen     Tablet .. 650 milliGRAM(s) Oral every 6 hours PRN Temp greater or equal to 38C (100.4F), Mild Pain (1 - 3)  aluminum hydroxide/magnesium hydroxide/simethicone Suspension 30 milliLiter(s) Oral every 4 hours PRN Dyspepsia  melatonin 3 milliGRAM(s) Oral at bedtime PRN Insomnia  ondansetron Injectable 4 milliGRAM(s) IV Push every 8 hours PRN Nausea and/or Vomiting        I&O's Summary    05 Nov 2022 08:01  -  06 Nov 2022 07:00  --------------------------------------------------------  IN: 711 mL / OUT: 1050 mL / NET: -339 mL        PHYSICAL EXAM:  Vital Signs Last 24 Hrs  T(C): 36.5 (06 Nov 2022 11:49), Max: 37.4 (05 Nov 2022 14:54)  T(F): 97.7 (06 Nov 2022 11:49), Max: 99.3 (05 Nov 2022 14:54)  HR: 65 (06 Nov 2022 11:49) (65 - 72)  BP: 135/74 (06 Nov 2022 11:49) (135/74 - 185/77)  BP(mean): 70 (05 Nov 2022 23:15) (70 - 70)  RR: 18 (06 Nov 2022 11:49) (17 - 18)  SpO2: 100% (06 Nov 2022 11:49) (97% - 100%)    Parameters below as of 06 Nov 2022 11:49  Patient On (Oxygen Delivery Method): room air          Constitutional: NAD, Resting  ENT: Supple, No JVD  Lungs: CTA B/L, Non-labored breathing  Cardio: RRR, S1/S2, No murmur  Abdomen: Soft, Nontender, Nondistended; Bowel sounds present  Extremities: No calf tenderness, No pitting edema  Musculoskeletal:   No clubbing or cyanosis of digits; no joint swelling or tenderness to palpation  Psych: Calm, cooperative affect appropriate  Neuro: Awake and alert  Skin: No rashes; no palpable lesions    LABS:                        11.7   6.99  )-----------( 163      ( 06 Nov 2022 02:48 )             34.9     11-06    140  |  104  |  10.0  ----------------------------<  103<H>  3.5   |  23.0  |  0.65    Ca    10.2      06 Nov 2022 02:48  Phos  3.2     11-05  Mg     2.3     11-05    TPro  6.5<L>  /  Alb  4.1  /  TBili  1.1  /  DBili  x   /  AST  1594<H>  /  ALT  2248<H>  /  AlkPhos  353<H>  11-06    PT/INR - ( 04 Nov 2022 19:17 )   PT: 12.2 sec;   INR: 1.05 ratio         PTT - ( 04 Nov 2022 19:17 )  PTT:25.9 sec  CARDIAC MARKERS ( 04 Nov 2022 14:06 )  x     / <0.01 ng/mL / x     / x     / x              CAPILLARY BLOOD GLUCOSE            RADIOLOGY REVIEWED  
Pt is seen this am at the bedside in am  she is awake alert , asking to talk to her son , ambulates to nursing station   Pt denies abd pain when asked , tolerating diet     she is with large BM this am loose per nursing   Gi follow up appreciated     called son to update and discuss with him GI input current condition and Dc planning   He is still has concern about her constipation and insisting taht we should make sure she is clear before we discharge   as he asked called the Pt 's GI attending group she goes , DR Padgett is informed about her hsopialtilaztion , findings , lab Ct scan result reviewed with him , including hepatitis serology     recalled the son around 1:15 , spend several minutes on the phone  explained and updating him re my conversation with  Dr Adame   verbal instructions re discharge , monitoring her BM and giving her laxative       MEDICATIONS  (STANDING):  ascorbic acid 500 milliGRAM(s) Oral daily  aspirin  chewable 81 milliGRAM(s) Oral daily  enoxaparin Injectable 40 milliGRAM(s) SubCutaneous every 24 hours  ferrous    sulfate 325 milliGRAM(s) Oral daily  lactated ringers. 1000 milliLiter(s) (80 mL/Hr) IV Continuous <Continuous>  memantine 10 milliGRAM(s) Oral two times a day  mesalamine ER Capsule 500 milliGRAM(s) Oral two times a day  metoprolol tartrate 25 milliGRAM(s) Oral three times a day  mineral oil enema 133 milliLiter(s) Rectal two times a day  mirabegron ER 25 milliGRAM(s) Oral <User Schedule>  OLANZapine Injectable 5 milliGRAM(s) IntraMuscular once  pantoprazole    Tablet 40 milliGRAM(s) Oral before breakfast  polyethylene glycol 3350 17 Gram(s) Oral two times a day  polyethylene glycol 3350 17 Gram(s) Oral once  senna 2 Tablet(s) Oral daily  sertraline 25 milliGRAM(s) Oral daily    Vital Signs Last 24 Hrs  T(C): 36.6 (11 Nov 2022 05:08), Max: 37.2 (10 Nov 2022 16:33)  T(F): 97.8 (11 Nov 2022 05:08), Max: 98.9 (10 Nov 2022 16:33)  HR: 64 (11 Nov 2022 05:08) (64 - 75)  BP: 130/75 (11 Nov 2022 05:08) (130/75 - 141/75)  BP(mean): --  RR: 18 (11 Nov 2022 05:08) (18 - 18)  SpO2: 98% (11 Nov 2022 05:08) (95% - 99%)    Parameters below as of 10 Nov 2022 22:06  Patient On (Oxygen Delivery Method): room air          Constitutional: NAD, Resting in the chair   Lungs: CTA bilateral  Non-labored breathing  Cardio: RRR, S1/S2, No murmur  Abdomen: Soft, Nontender, Nondistended; BS +   Extremities: No calf tenderness, No pitting edema  Neuro: Awake , confused pleasant , follow simple commands   Skin : normal color ,warm     11-11    143  |  107  |  14.4  ----------------------------<  93  4.1   |  26.0  |  0.71    Ca    9.8      11 Nov 2022 06:37  Phos  3.0     11-11  Mg     2.2     11-11    TPro  6.2<L>  /  Alb  3.6  /  TBili  0.3<L>  /  DBili  x   /  AST  81<H>  /  ALT  393<H>  /  AlkPhos  201<H>  11-11  
    Chief Complaint:  Patient is a 81y old  Female who presents with a chief complaint of Pancreatitis v Constipation/Stercoral Colitis (09 Nov 2022 17:56)      HPI/ 24 hr events: Patient seen and examined at bedside, no overnight events. Pt sitting on the chair this AM, feeling well. Tolerating diet. Vitals are stable, no leukocytosis, Hemoglobin stable, LFTs downtrending. Pt denies any BMs today. Denies nausea, vomiting, abdominal pain.       REVIEW OF SYSTEMS:   General: Negative  HEENT: Negative  CV: Negative  Respiratory: Negative  GI: See HPI  : Negative  MSK: Negative  Hematologic: Negative  Skin: Negative    MEDICATIONS:   MEDICATIONS  (STANDING):  ascorbic acid 500 milliGRAM(s) Oral daily  aspirin  chewable 81 milliGRAM(s) Oral daily  enoxaparin Injectable 40 milliGRAM(s) SubCutaneous every 24 hours  ferrous    sulfate 325 milliGRAM(s) Oral daily  memantine 10 milliGRAM(s) Oral two times a day  mesalamine ER Capsule 500 milliGRAM(s) Oral two times a day  metoprolol tartrate 25 milliGRAM(s) Oral three times a day  mineral oil enema 133 milliLiter(s) Rectal two times a day  mirabegron ER 25 milliGRAM(s) Oral <User Schedule>  OLANZapine Injectable 5 milliGRAM(s) IntraMuscular once  pantoprazole    Tablet 40 milliGRAM(s) Oral before breakfast  polyethylene glycol 3350 17 Gram(s) Oral two times a day  polyethylene glycol/electrolyte Solution 1000 milliLiter(s) Oral once  senna 2 Tablet(s) Oral daily  sertraline 25 milliGRAM(s) Oral daily    MEDICATIONS  (PRN):  acetaminophen     Tablet .. 650 milliGRAM(s) Oral every 6 hours PRN Temp greater or equal to 38C (100.4F), Mild Pain (1 - 3)  aluminum hydroxide/magnesium hydroxide/simethicone Suspension 30 milliLiter(s) Oral every 4 hours PRN Dyspepsia  melatonin 3 milliGRAM(s) Oral at bedtime PRN Insomnia  ondansetron Injectable 4 milliGRAM(s) IV Push every 8 hours PRN Nausea and/or Vomiting      ALLERGIES:   Allergies    No Known Allergies    Intolerances        VITAL SIGNS:   Vital Signs Last 24 Hrs  T(C): 36.8 (10 Nov 2022 11:30), Max: 36.8 (10 Nov 2022 11:30)  T(F): 98.3 (10 Nov 2022 11:30), Max: 98.3 (10 Nov 2022 11:30)  HR: 65 (10 Nov 2022 11:30) (65 - 66)  BP: 163/84 (10 Nov 2022 11:30) (117/70 - 163/84)  BP(mean): --  RR: 18 (10 Nov 2022 11:30) (18 - 18)  SpO2: 99% (10 Nov 2022 11:30) (97% - 100%)    Parameters below as of 10 Nov 2022 11:30  Patient On (Oxygen Delivery Method): room air      I&O's Summary    09 Nov 2022 07:01  -  10 Nov 2022 07:00  --------------------------------------------------------  IN: 660 mL / OUT: 0 mL / NET: 660 mL        PHYSICAL EXAM:   GENERAL:  No acute distress  HEENT:  NC/AT, conjunctiva clear, sclera anicteric  CHEST:  No increased effort, breath sounds clear  HEART:  Regular rhythm, S1, S2,   ABDOMEN:  Soft, non-tender, non-distended, normoactive bowel sounds, no rebound or guarding  EXTREMITIES: No edema  SKIN:  Warm, dry  NEURO:  Calm, cooperative    LABS:  CBC Full  -  ( 09 Nov 2022 09:33 )  WBC Count : 7.21 K/uL  RBC Count : 3.62 M/uL  Hemoglobin : 10.6 g/dL  Hematocrit : 33.0 %  Platelet Count - Automated : 183 K/uL  Mean Cell Volume : 91.2 fl  Mean Cell Hemoglobin : 29.3 pg  Mean Cell Hemoglobin Concentration : 32.1 gm/dL  Auto Neutrophil # : 5.54 K/uL  Auto Lymphocyte # : 1.02 K/uL  Auto Monocyte # : 0.41 K/uL  Auto Eosinophil # : 0.17 K/uL  Auto Basophil # : 0.05 K/uL  Auto Neutrophil % : 76.8 %  Auto Lymphocyte % : 14.1 %  Auto Monocyte % : 5.7 %  Auto Eosinophil % : 2.4 %  Auto Basophil % : 0.7 %    11-09    140  |  105  |  21.3<H>  ----------------------------<  151<H>  4.5   |  26.0  |  0.80    Ca    9.5      09 Nov 2022 09:33    TPro  6.0<L>  /  Alb  3.4  /  TBili  0.2<L>  /  DBili  0.1  /  AST  106<H>  /  ALT  580<H>  /  AlkPhos  216<H>  11-09    LIVER FUNCTIONS - ( 09 Nov 2022 09:33 )  Alb: 3.4 g/dL / Pro: 6.0 g/dL / ALK PHOS: 216 U/L / ALT: 580 U/L / AST: 106 U/L / GGT: x                         Hepatitis Be Antibody: Nonreact (11-08-22 @ 09:15)  Hepatitis Be Antigen: Nonreact (11-08-22 @ 09:15)  Hepatitis B Surface Antigen: Nonreact (11-08-22 @ 09:15)  Hepatitis C Virus S/CO Ratio: 0.06 S/CO (11-08-22 @ 09:15)  Hepatitis A IgM Antibody: Nonreact (11-08-22 @ 09:15)  Hepatitis B Core IgM Antibody: Nonreact (11-08-22 @ 09:15)      RADIOLOGY & ADDITIONAL STUDIES:    < from: Xray Kidney Ureter Bladder (11.09.22 @ 15:52) >    ACC: [id number] EXAM:  XR KUB 1 VIEW                          PROCEDURE DATE:  11/09/2022          INTERPRETATION:  Clinical history: 81-year-old female, rule out   obstruction.    Supine view of the abdomen is correlated with the abdominal CT of   11/4/2022.    FINDINGS: Nonobstructive bowel gas pattern/constipation with redundant   sigmoid colon, unchanged.    No pneumoperitoneum or acute osseous finding. Post cholecystectomy.    IMPRESSION:  Nonobstructive bowel gas pattern/constipation, grossly unchanged    --- End of Report ---            TAPAN FERNANDEZ DO; Attending Radiologist  This document has been electronically signed. Nov 9 2022  4:14PM    < end of copied text >      
    Chief Complaint:  Patient is a 81y old  Female who presents with a chief complaint of Pancreatitis v Constipation/Stercoral Colitis (07 Nov 2022 10:39)      HPI/ 24 hr events: Patient seen and examined at bedside, son Randy is present. Pt feeling well today. Tolerating full diet, eating a grilled cheese presently. RN reports 1 BM overnight and 1 BM this morning. Denies nausea, vomiting, diarrhea, abdominal pain, hematemesis, hematochezia, melena. Vitals are stable, CBC stable, LFTs and lipase are downtrending.       REVIEW OF SYSTEMS:   General: Negative  HEENT: Negative  CV: Negative  Respiratory: Negative  GI: See HPI  : Negative  MSK: Negative  Hematologic: Negative  Skin: Negative    MEDICATIONS:   MEDICATIONS  (STANDING):  amLODIPine   Tablet 5 milliGRAM(s) Oral daily  ascorbic acid 500 milliGRAM(s) Oral daily  aspirin  chewable 81 milliGRAM(s) Oral daily  atorvastatin 40 milliGRAM(s) Oral at bedtime  enoxaparin Injectable 40 milliGRAM(s) SubCutaneous every 24 hours  ferrous    sulfate 325 milliGRAM(s) Oral daily  lactated ringers. 1000 milliLiter(s) (75 mL/Hr) IV Continuous <Continuous>  memantine 10 milliGRAM(s) Oral two times a day  mesalamine ER Capsule 500 milliGRAM(s) Oral two times a day  metoprolol tartrate 25 milliGRAM(s) Oral three times a day  mineral oil enema 133 milliLiter(s) Rectal two times a day  mirabegron ER 25 milliGRAM(s) Oral <User Schedule>  OLANZapine Injectable 5 milliGRAM(s) IntraMuscular once  pantoprazole    Tablet 40 milliGRAM(s) Oral before breakfast  polyethylene glycol 3350 17 Gram(s) Oral two times a day  senna 2 Tablet(s) Oral daily  sertraline 25 milliGRAM(s) Oral daily    MEDICATIONS  (PRN):  acetaminophen     Tablet .. 650 milliGRAM(s) Oral every 6 hours PRN Temp greater or equal to 38C (100.4F), Mild Pain (1 - 3)  aluminum hydroxide/magnesium hydroxide/simethicone Suspension 30 milliLiter(s) Oral every 4 hours PRN Dyspepsia  melatonin 3 milliGRAM(s) Oral at bedtime PRN Insomnia  ondansetron Injectable 4 milliGRAM(s) IV Push every 8 hours PRN Nausea and/or Vomiting      ALLERGIES:   Allergies    No Known Allergies    Intolerances        VITAL SIGNS:   Vital Signs Last 24 Hrs  T(C): 36.6 (08 Nov 2022 07:55), Max: 36.8 (08 Nov 2022 04:18)  T(F): 97.9 (08 Nov 2022 07:55), Max: 98.3 (08 Nov 2022 04:18)  HR: 65 (08 Nov 2022 07:55) (64 - 67)  BP: 134/73 (08 Nov 2022 07:55) (114/56 - 177/81)  BP(mean): --  RR: 18 (08 Nov 2022 07:55) (16 - 18)  SpO2: 97% (08 Nov 2022 07:55) (97% - 97%)    Parameters below as of 08 Nov 2022 04:18  Patient On (Oxygen Delivery Method): room air      I&O's Summary    07 Nov 2022 07:01  -  08 Nov 2022 07:00  --------------------------------------------------------  IN: 696 mL / OUT: 0 mL / NET: 696 mL        PHYSICAL EXAM:   GENERAL:  No acute distress  HEENT:  NC/AT, conjunctiva clear, sclera anicteric  CHEST:  No increased effort, breath sounds clear  HEART:  Regular rhythm, S1, S2,   ABDOMEN:  Soft, non-tender, non-distended, normoactive bowel sounds, no rebound or guarding  EXTREMITIES: No edema  SKIN:  Warm, dry  NEURO:  Calm, cooperative    LABS:                        11.9   7.54  )-----------( 156      ( 07 Nov 2022 06:44 )             36.0     11-08    137  |  105  |  9.2  ----------------------------<  91  4.2   |  22.0  |  0.68    Ca    9.7      08 Nov 2022 09:15  Phos  3.2     11-07    TPro  6.2<L>  /  Alb  3.4  /  TBili  0.6  /  DBili  x   /  AST  208<H>  /  ALT  >645<H>  /  AlkPhos  258<H>  11-08    LIVER FUNCTIONS - ( 08 Nov 2022 09:15 )  Alb: 3.4 g/dL / Pro: 6.2 g/dL / ALK PHOS: 258 U/L / ALT: >645 U/L / AST: 208 U/L / GGT: x                 Lipase, Serum: 134 U/L (11-08-22 @ 09:15)              RADIOLOGY & ADDITIONAL STUDIES:

## 2022-11-11 NOTE — PROGRESS NOTE ADULT - REASON FOR ADMISSION
Pancreatitis v Constipation/Stercoral Colitis

## 2022-11-11 NOTE — PROGRESS NOTE ADULT - PROVIDER SPECIALTY LIST ADULT
Hospitalist
Gastroenterology
Hospitalist
Hospitalist
Gastroenterology
Hospitalist
Gastroenterology

## 2022-11-11 NOTE — PROGRESS NOTE ADULT - NS ATTEND AMEND GEN_ALL_CORE FT
81F here with constipation. Discussed with aid at bedside. She had a bowel movement yesterday. She completed the moviprep. Abdominal exam benign   Continue high fiber diet, avoid nsaids. Miralax/Senna/enemas prn  GI will sign off
I evaluated this pt. with my NP and agree with the above assessment and management plan. DEEPAK w/o evidence of rectal fecal impaction but pt. is chronically constipated and has not responded to current laxation. Will order 1 liter of Moviprep today to hopefully clean her out.
I evaluated this pt. with my NP and agree with the above assessment and management plan. Trend LFT's. Serologies ordered and pending. May be reactive. Constipation improving.
Patient seen and examined.  Patient with dementia, chronic constipation, possibly pancreatitis most likely chronic pancreatitis now with biliary etiology.  Abdominal exam is benign.  Need regular bowel regimen.  EUS as outpatient.  No decision for any ERCP will be based on that and any further symptoms.  Discussed at length with the hospitalist.

## 2022-11-11 NOTE — PROGRESS NOTE ADULT - PROBLEM SELECTOR PLAN 1
Abdominal exam is benign. 1 BM reported yesterday morning. Pt completed 1L of Moviprep yesterday afternoon with no reported BM afterwards.  KUB 11/10 showed Nonobstructive bowel gas pattern/constipation, grossly unchanged.  - Continue Miralax/Senna/Mineral oil enema as ordered   - Need regular bowel regimen at home.  - Nursing staff will provide more time in bathroom   - OPT F/U with her Gastroenterologist Dr. Padgett.

## 2022-11-11 NOTE — DISCHARGE NOTE NURSING/CASE MANAGEMENT/SOCIAL WORK - PATIENT PORTAL LINK FT
You can access the FollowMyHealth Patient Portal offered by [facility] by registering at the following website: [email or web address]. By joining [facility]’s FollowMyHealth portal, you will also be able to view your health information using other applications (apps) compatible with our system.

## 2022-11-11 NOTE — DISCHARGE NOTE NURSING/CASE MANAGEMENT/SOCIAL WORK - NSDCFUADDAPPT_GEN_ALL_CORE_FT
follow up with your gastroenterologist   get liver tests checked in 1-2 weeks   EBV test IG g positive discuss with your gastroenterologist for further testing as needed     follow up need endoscopic US to evaluate calcific densities in pancreas

## 2022-11-11 NOTE — PROGRESS NOTE ADULT - NUTRITIONAL ASSESSMENT
This patient has been assessed with a concern for Malnutrition and has been determined to have a diagnosis/diagnoses of Moderate protein-calorie malnutrition.    This patient is being managed with:   Diet Regular-  Supplement Feeding Modality:  Oral  Ensure Enlive Cans or Servings Per Day:  1       Frequency:  Three Times a day  Entered: Nov 8 2022  8:44AM    

## 2022-11-11 NOTE — PROGRESS NOTE ADULT - ASSESSMENT
80 y/o F with PMHx Alzheimer's Dementia, Aortic Valve replacement, PPM, MDD, HTN, HLD, UC, Chronic Constipation, GERD presents with abdominal pain and vomiting, found to have dilated CBD on U/S, possible contracted gallbladder vs cholecystectomy and CT imaging with constipation/stercoral colitis and transaminitis.
81F with PMHX Alzheimer's Dementia, Aortic Valve replacement, PPM ([facility] 2016), MDD, HTN, HLD, Ulcerative Colitis, Chronic Constipation, GERD presents to [facility] ER c/o acute onset epigastric abdominal pain with nausea/vomiting admitted for Acute Pancreatitis vs Severe Constipation/Stercoral Colitis.      1-Abdominal pain with elevated Transaminitis  Concern for possible shock liver vs infective hepatitis   trending down   Gi follow up requested ; no MRCP due to PPM , may need EUS if no improvement per GI   DC statin   cont to trend , serology and hepatitis profile P   d/w son the plan     2-Severe Constipation/Stercoral Colitis  -Unlikely pancreatitis  o had few BM since yesterday   benign abd exam   cont bowel regimen   advance diet     3- Hypokalemia   replaced  resolved     4-Alzheimer's with Dementia,  no further sign of agitation since admission   will DC 1 :1   ambulated with assistance   cont Memantine 10mg IR BID    5-h/o Ulcerative Colitis,  on Mesalamine 500mg BID    6- GERD   cont ppi     7-HTN  controlled   -Metoprolol Tartrate 25mg TID    8-MDD  -Sertraline 25mg q24    9-Overactive bladder  -Myrbetriq    OOB   ambulate with PT 
81F with PMHX Alzheimer's Dementia, Aortic Valve replacement, PPM ([facility] 2016), MDD, HTN, HLD, Ulcerative Colitis, Chronic Constipation, GERD presents to [facility] ER c/o acute onset epigastric abdominal pain with nausea/vomiting admitted for Acute Pancreatitis vs Severe Constipation/Stercoral Colitis.      1-Abdominal pain with elevated Transaminitis  Concern for possible shock liver vs infective hepatitis   trending down   Gi follow up requested ; no MRCP due to PPM , may need EUS outpatient   d/w son , statin stopped   serology result neg for hepatitis b/c     2- Constipation severe recurrent   having BM however KUB still with constipation   GI reevaluation requested   movi prep ordered   cont to monitor     3- Abnormal Cr   add IVF   repeat lytes in am     4- Possible acute pancreatitis   no abd pain . clinically better   Ct imaging reviewed   lipase improved   follow up with GI after DC for EUS     5-*Hypokalemia   replaced     6-Alzheimer's with Dementia,  no further sign of agitation since admission   cont Memantine 10mg IR BID  off 1:1     7--h/o Ulcerative Colitis,  on Mesalamine 500mg BID    8-- GERD   cont ppi     9--HTN  controlled   -Metoprolol Tartrate 25mg TID    10--MDD  -Sertraline 25mg q24    11-Overactive bladder  -Myrbetriq    discharge soon 
81F with PMHX Alzheimer's Dementia, Aortic Valve replacement, PPM ([facility] 2016), MDD, HTN, HLD, Ulcerative Colitis, Chronic Constipation, GERD presents to [facility] ER c/o acute onset epigastric abdominal pain with nausea/vomiting admitted for Acute Pancreatitis , transaminitis , Severe Constipation/Stercoral Colitis.      1-Abdominal pain with elevated Transaminitis  acute pancreatitis   LFTs are trending down , GI consulted   MRCP can not be done due to PPM   need outpatient follow up with GI  for EUS   stop statin   hepatitis serology neg hep c and b   son informed verbally about the result   EBV ig G positive   Pt;s private GI attending informed as well     2- Cronic constipation , stercoral colitis - constipated   pt is with BM's and had movie prep given also by GI team   had 2 BM sine movie prep   KUB 11/09 non obstructive gas pattern , constipation   Pt si with cronic constipation , she needs daily bowel regimen given with enema or supp if no BM 2-3 days   instructted son Randy     3- Prerenal azotemia   improved   s/p IVF   encourage oral fluid intake     4*Hypokalemia   replaced   resolved     6-Alzheimer's with Dementia,  cont Memantine 10mg IR BID  off 1:1   ambulates independent , stable     7--h/o Ulcerative Colitis,  on Mesalamine 500mg BID    8-- GERD   cont ppi     9--HTN  -Metoprolol Tartrate 25mg TID    10--MDD  -Sertraline 25mg q24    11-Overactive bladder  -Myrbetriq    discharge soon hoem with son 
81F with PMHX Alzheimer's Dementia, Aortic Valve replacement, PPM ([facility] 2016), MDD, HTN, HLD, Ulcerative Colitis, Chronic Constipation, GERD presents to [facility] ER c/o acute onset epigastric abdominal pain with nausea/vomiting admitted for Acute Pancreatitis vs Severe Constipation/Stercoral Colitis.      1-Abdominal pain with elevated Transaminitis  Concern for possible shock liver vs infective hepatitis   trending down   Gi follow up requested ; no MRCP due to PPM , may need EUS if no improvement per GI   DC statin   cont to trend , serology and hepatitis profile P   d/w son the plan     2- Possible acute pancreatitis   no abd pain   Ct imaging reviewed   lipase improved   follow up with GI after DC     3-Severe Constipation/Stercoral Colitis  having BM   KUB ordered   son conscern that she has been having recurrent frequent issues with severe constipation   P constipation   instructed nurses to cont miralax as prescribided   hold enema   rectal exam no stool  ,no impaction   abd bening exam   son informed assured     4-*Hypokalemia   resolve   replaced     5-Alzheimer's with Dementia,  no further sign of agitation since admission   will DC 1 :1   ambulated with assistance   cont Memantine 10mg IR BID    6--h/o Ulcerative Colitis,  on Mesalamine 500mg BID    7- GERD   cont ppi     8--HTN  controlled   -Metoprolol Tartrate 25mg TID    9-MDD  -Sertraline 25mg q24    10-Overactive bladder  -Myrbetriq    OOB   ambulate with PT 
81F with PMHX Alzheimer's Dementia, Aortic Valve replacement, PPM ([facility] 2016), MDD, HTN, HLD, Ulcerative Colitis, Chronic Constipation, GERD presents to [facility] ER c/o acute onset epigastric abdominal pain with nausea/vomiting admitted for Acute Pancreatitis vs Severe Constipation/Stercoral Colitis.    Abdominal pain with elevated Transaminitis  Acute Pancreatitis vs Severe Constipation/Stercoral Colitis  Concern for possible shock liver vs infective hepatitis   -Trend LFTs  -US and CT reviewed  -Repeat Labs/Lipase - still elevated  -Unlikely pancreatitis  -IVF Hydration NSS 75cc/hr  -CLD - adat  -VTE PPX  -Zosyn 3.375g IV q8 for empiric abx coverage  -Advance Bowel Regimen with Miralax/Senna   -Defer MRCP/ERCP to GI - given PPM - Can consider EUS   -GI recs appreciated    Alzheimer's Dementia  -Supportive Care  -Memantine 10mg IR BID    Ulcerative Colitis, GERD  -Pantoprazole 40mg q24  -Mesalamine 500mg BID    HTN, HLD, PPM   -Metoprolol Tartrate 25mg TID  -Atorvastatin 40mg q24    MDD  -Sertraline 25mg q24    Overactive bladder  -Myrbetriq    Spoke to sister over the phone
81F with PMHX Alzheimer's Dementia, Aortic Valve replacement, PPM ([facility] 2016), MDD, HTN, HLD, Ulcerative Colitis, Chronic Constipation, GERD presents to [facility] ER c/o acute onset epigastric abdominal pain with nausea/vomiting admitted for Acute Pancreatitis vs Severe Constipation/Stercoral Colitis.    Abdominal pain with elevated Transaminitis  Acute Pancreatitis vs Severe Constipation/Stercoral Colitis  Concern for possible shock liver vs infective hepatitis   -Trend LFTs  -US and CT reviewed  -Repeat Labs/Lipase - still elevated  -Unlikely pancreatitis  -IVF Hydration NSS 75cc/hr  -CLD - adat  -VTE PPX  -Zosyn 3.375g IV q8 for empiric abx coverage  -Advance Bowel Regimen with Miralax/Senna   -Defer MRCP/ERCP to GI - given PPM - Can consider EUS   -GI recs appreciated    Alzheimer's Dementia  -Supportive Care  -Memantine 10mg IR BID    Ulcerative Colitis, GERD  -Pantoprazole 40mg q24  -Mesalamine 500mg BID    HTN, HLD, PPM   -Metoprolol Tartrate 25mg TID  -Atorvastatin 40mg q24    MDD  -Sertraline 25mg q24    Overactive bladder  -Myrbetriq    Spoke to sister and son at bedside
81F with PMHX Alzheimer's Dementia, Aortic Valve replacement, PPM ([facility] 2016), MDD, HTN, HLD, Ulcerative Colitis, Chronic Constipation, GERD presents to [facility] ER c/o acute onset epigastric abdominal pain with nausea/vomiting admitted for Acute Pancreatitis vs Severe Constipation/Stercoral Colitis.      1-Abdominal pain with elevated Transaminitis  Concern for possible shock liver vs infective hepatitis   trending down     2-Severe Constipation/Stercoral Colitis  -Unlikely pancreatitis  cont IVF Hydration NSS 75cc/hr  advance diet   will discharge zosyn   cont bowel regimen , enema , laxative   -Defer MRCP  GI - given PPM -   GI follow up      3- Hypokalemia   replace PO k ordered     4-Alzheimer's with Dementia, agitated pulling lines   placed on 1:1   cont Memantine 10mg IR BID    5-h/o Ulcerative Colitis,  on Mesalamine 500mg BID    6- GERD   cont ppi     7-HTN  controlled   -Metoprolol Tartrate 25mg TID  -Atorvastatin 40mg q24    8-MDD  -Sertraline 25mg q24    9-Overactive bladder  -Myrbetriq    OOB   ambulate with PT 
82 y/o F with PMHx Alzheimer's Dementia, Aortic Valve replacement, PPM, MDD, HTN, HLD, UC, Chronic Constipation, GERD presents with abdominal pain and vomiting, found to have dilated CBD on U/S, possible contracted gallbladder vs cholecystectomy and CT imaging with constipation/stercoral colitis and transaminitis.   
80 y/o F with PMHx Alzheimer's Dementia, Aortic Valve replacement, PPM, MDD, HTN, HLD, UC, Chronic Constipation, GERD presents with abdominal pain and vomiting, found to have dilated CBD on U/S, possible contracted gallbladder vs cholecystectomy and CT imaging with constipation/stercoral colitis and transaminitis.
82 y/o F with PMHx Alzheimer's Dementia, Aortic Valve replacement, PPM, MDD, HTN, HLD, UC, Chronic Constipation, GERD presents with abdominal pain and vomiting, found to have dilated CBD on U/S, possible contracted gallbladder vs cholecystectomy and CT imaging with constipation/stercoral colitis and transaminitis.

## 2023-01-30 RX ORDER — METOPROLOL TARTRATE 50 MG
1 TABLET ORAL
Qty: 0 | Refills: 0 | DISCHARGE

## 2023-01-30 RX ORDER — SERTRALINE 25 MG/1
1 TABLET, FILM COATED ORAL
Qty: 0 | Refills: 0 | DISCHARGE

## 2023-01-30 RX ORDER — MESALAMINE 400 MG
1 TABLET, DELAYED RELEASE (ENTERIC COATED) ORAL
Qty: 0 | Refills: 0 | DISCHARGE

## 2023-01-30 RX ORDER — ATORVASTATIN CALCIUM 80 MG/1
1 TABLET, FILM COATED ORAL
Qty: 0 | Refills: 0 | DISCHARGE

## 2023-01-30 RX ORDER — DOCUSATE SODIUM 100 MG
1 CAPSULE ORAL
Qty: 0 | Refills: 0 | DISCHARGE

## 2023-01-30 RX ORDER — POLYETHYLENE GLYCOL 3350 17 G/17G
17 POWDER, FOR SOLUTION ORAL
Qty: 0 | Refills: 0 | DISCHARGE

## 2023-01-30 RX ORDER — MEMANTINE HYDROCHLORIDE 10 MG/1
1 TABLET ORAL
Qty: 0 | Refills: 0 | DISCHARGE

## 2023-01-30 RX ORDER — PANTOPRAZOLE SODIUM 20 MG/1
1 TABLET, DELAYED RELEASE ORAL
Qty: 0 | Refills: 0 | DISCHARGE

## 2023-01-30 RX ORDER — MIRABEGRON 50 MG/1
1 TABLET, EXTENDED RELEASE ORAL
Qty: 0 | Refills: 0 | DISCHARGE

## 2023-05-01 ENCOUNTER — NON-APPOINTMENT (OUTPATIENT)
Age: 83
End: 2023-05-01

## 2023-05-02 ENCOUNTER — APPOINTMENT (OUTPATIENT)
Dept: NEUROLOGY | Facility: CLINIC | Age: 83
End: 2023-05-02
Payer: MEDICARE

## 2023-05-02 VITALS
WEIGHT: 128 LBS | DIASTOLIC BLOOD PRESSURE: 73 MMHG | HEART RATE: 65 BPM | HEIGHT: 64 IN | BODY MASS INDEX: 21.85 KG/M2 | SYSTOLIC BLOOD PRESSURE: 139 MMHG

## 2023-05-02 DIAGNOSIS — R41.89 OTHER SYMPTOMS AND SIGNS INVOLVING COGNITIVE FUNCTIONS AND AWARENESS: ICD-10-CM

## 2023-05-02 DIAGNOSIS — K21.9 GASTRO-ESOPHAGEAL REFLUX DISEASE W/OUT ESOPHAGITIS: ICD-10-CM

## 2023-05-02 DIAGNOSIS — N31.8 OTHER NEUROMUSCULAR DYSFUNCTION OF BLADDER: ICD-10-CM

## 2023-05-02 DIAGNOSIS — I10 ESSENTIAL (PRIMARY) HYPERTENSION: ICD-10-CM

## 2023-05-02 DIAGNOSIS — E78.5 HYPERLIPIDEMIA, UNSPECIFIED: ICD-10-CM

## 2023-05-02 DIAGNOSIS — R45.1 RESTLESSNESS AND AGITATION: ICD-10-CM

## 2023-05-02 DIAGNOSIS — G47.00 INSOMNIA, UNSPECIFIED: ICD-10-CM

## 2023-05-02 PROBLEM — Z95.0 PRESENCE OF CARDIAC PACEMAKER: Chronic | Status: ACTIVE | Noted: 2022-11-04

## 2023-05-02 PROCEDURE — 99205 OFFICE O/P NEW HI 60 MIN: CPT

## 2023-05-02 RX ORDER — ASPIRIN 81 MG/1
81 TABLET, CHEWABLE ORAL
Qty: 30 | Refills: 3 | Status: ACTIVE | COMMUNITY
Start: 2023-05-02

## 2023-05-02 RX ORDER — MELATONIN 3 MG
3 CAPSULE ORAL AT BEDTIME
Refills: 0 | Status: ACTIVE | COMMUNITY
Start: 2023-05-02

## 2023-05-02 RX ORDER — MULTIVIT-MIN/IRON/FOLIC ACID/K 18-600-40
500 CAPSULE ORAL
Refills: 0 | Status: ACTIVE | COMMUNITY
Start: 2023-05-02

## 2023-05-02 RX ORDER — QUETIAPINE 25 MG/1
25 TABLET, FILM COATED ORAL AT BEDTIME
Refills: 0 | Status: ACTIVE | COMMUNITY
Start: 2023-05-02

## 2023-05-02 RX ORDER — MIRABEGRON 25 MG/1
25 TABLET, FILM COATED, EXTENDED RELEASE ORAL
Refills: 0 | Status: ACTIVE | COMMUNITY
Start: 2023-05-02

## 2023-05-02 RX ORDER — METOPROLOL SUCCINATE 25 MG/1
25 TABLET, EXTENDED RELEASE ORAL DAILY
Refills: 0 | Status: ACTIVE | COMMUNITY
Start: 2023-05-02

## 2023-05-02 RX ORDER — ATORVASTATIN CALCIUM 40 MG/1
40 TABLET, FILM COATED ORAL DAILY
Qty: 30 | Refills: 2 | Status: ACTIVE | COMMUNITY
Start: 2023-05-02

## 2023-05-02 RX ORDER — PANTOPRAZOLE SODIUM 40 MG/1
40 GRANULE, DELAYED RELEASE ORAL DAILY
Refills: 0 | Status: ACTIVE | COMMUNITY
Start: 2023-05-02

## 2023-05-02 NOTE — ASSESSMENT
[FreeTextEntry1] : Assessment:\par 83yo RH WW with ongoing dementia, probably LOAD, per other MD.\par MMSE<10, pt advanced, though can maintain a significant conversation, though limited to circumstantial.\par Motor ok, only slow, no parkinsonism. \par \par Diagnostic Impression:\par -LOAD\par -insomnia, well managed.\par \par Plan:\par -no change in meds needed for now\par -garret increase activity to every day if possible, given her good motor function\par -can consider if antidepressants can be used in the future if pt continues to have depressive symptoms.\par I recommended to pursue mental and physical activity and to adhere to Mediterranean type of diet.\par \par

## 2023-05-02 NOTE — HISTORY OF PRESENT ILLNESS
[FreeTextEntry1] : PPM, not MRI compatible.\par S/p AO valve replacement.\par \par COVID 2023-no tx.\par COVID VACCINE FULL.\par \par HPI: 81yo RH WW with HTN, HLD, insomnia, bladder hyperactivity, here for concerns of memory loss.\par PMH of TIA.\par \par PMH:\par Pt formerly in [place], a few years ago Dx with LOAD.\par Mostly started with STM loss.\par Gradual progression, with loss of STM and also executive issues.\par COVID in early , with some progression of her cognitive changes as well.\par \par -Sleep: insomnia and awakening, but better with Seroquel and Melatonin (started in 3/2023).\par \par -Appetite: good, no major changes; apraxia with utensils\par \par -Motor symptoms: slower, a few falls\par \par -B/B: constipations, on Miralax and Senna\par \par -Psychiatric symptoms: may get upset and agitated when disoriented, more so at night and upon awakening; may not recognize son at times; ? if at times has depression/reduced mood\par \par -Functional status:\par [place] [employer] in Activities of Daily Living: needs assistance for most tasks\par 1. Bathing/Showerin\par 2. Dressin\par 3. Toiletin\par 4. Transferrin\par 5. Continence: 1\par 6: Feedin\par \par TOTAL: 0\par \par [place] Instrumental Activities of Daily Living:\par A. Ability to Use Telephone: 0\par B. Shoppin\par C. Food Preparation: 0\par D. Housekeepin\par E. Laundry: 0\par F. Transportation: 0\par G. Responsibility for Own Medications: 0\par H: Ability to Handle Finances: 0\par \par TOTAL: 0\par \par CDR: 1.5-2\par \par Attending senior center in [facility], but unable to do that anymore. Attends a Saturday social care center.\par \par -Professional status: clerical in healthcare.\par \par PCP and other physicians:\par -PCP: ITZEL ADAMS\par -Neuro: Emily Ramos; tried Aricept prior, dc due to fainting, probably from low HR.\par \par Workup done:\par -Prior records with Rachel, will retrieve.

## 2023-05-02 NOTE — PHYSICAL EXAM
[General Appearance - Alert] : alert [General Appearance - In No Acute Distress] : in no acute distress [Affect] : the affect was normal [Mood] : the mood was normal [Person] : oriented to person [Registration Intact] : recent registration memory intact [Concentration Intact] : normal concentrating ability [Comprehension] : comprehension intact [Reading] : reading intact [Past History] : adequate knowledge of personal past history [Cranial Nerves Optic (II)] : visual acuity intact bilaterally,  visual fields full to confrontation, pupils equal round and reactive to light [Cranial Nerves Oculomotor (III)] : extraocular motion intact [Cranial Nerves Trigeminal (V)] : facial sensation intact symmetrically [Cranial Nerves Facial (VII)] : face symmetrical [Cranial Nerves Vestibulocochlear (VIII)] : hearing was intact bilaterally [Cranial Nerves Glossopharyngeal (IX)] : tongue and palate midline [Cranial Nerves Accessory (XI - Cranial And Spinal)] : head turning and shoulder shrug symmetric [Cranial Nerves Hypoglossal (XII)] : there was no tongue deviation with protrusion [Motor Tone] : muscle tone was normal in all four extremities [Motor Strength] : muscle strength was normal in all four extremities [Involuntary Movements] : no involuntary movements were seen [No Muscle Atrophy] : normal bulk in all four extremities [Motor Handedness Right-Handed] : the patient is right hand dominant [Sensation Tactile Decrease] : light touch was intact [Abnormal Walk] : normal gait [Balance] : balance was intact [2+] : Ankle jerk left 2+ [Sclera] : the sclera and conjunctiva were normal [PERRL With Normal Accommodation] : pupils were equal in size, round, reactive to light, with normal accommodation [Extraocular Movements] : extraocular movements were intact [No APD] : no afferent pupillary defect [No RENÉ] : no internuclear ophthalmoplegia [Full Visual Field] : full visual field [Outer Ear] : the ears and nose were normal in appearance [Neck Appearance] : the appearance of the neck was normal [Edema] : there was no peripheral edema [] : no rash [Place] : disoriented to place [Time] : disoriented to time [Short Term Intact] : short term memory impaired [Span Intact] : the attention span was decreased [Visual Intact] : visual attention was ~T ~L decreased [Naming Objects] : difficulty naming common objects [Writing A Sentence] : difficulty writing a sentence [Fluency] : fluency not intact [Current Events] : inadequate knowledge of current events [Limited Balance] : balance was intact [Past-pointing] : there was no past-pointing [Tremor] : no tremor present [Plantar Reflex Right Only] : normal on the right [Plantar Reflex Left Only] : normal on the left [FreeTextEntry4] : MMSE < 10.\par Cannot draw pentagons, alt pattern nor spiral and clock. [FreeTextEntry8] : cautioned and slow gait, no parkinsonism.

## 2023-05-03 ENCOUNTER — NON-APPOINTMENT (OUTPATIENT)
Age: 83
End: 2023-05-03

## 2023-05-05 NOTE — DISCHARGE NOTE PROVIDER - NSDCCPGOAL_GEN_ALL_CORE_FT
[FreeTextEntry1] :   X-rays of left wrist taken in the office today: No acute fractures, subluxations, dislocations. To get better and follow your care plan as instructed.

## 2024-01-09 NOTE — DISCHARGE NOTE NURSING/CASE MANAGEMENT/SOCIAL WORK - NSPROMEDSBROUGHTTOHOSP_GEN_A_NUR
Wt: 189# RDN visited pt this a.m.   No problems with TF noted.  Pt's trach was downsized to a #6.  Pt has not been able to tolerate speaking valve or capping.  BG :164-195, crt 0.64 noted otherwise lab WNL.  Last BM 1/8.  Continue POC.   no

## 2024-02-13 ENCOUNTER — APPOINTMENT (OUTPATIENT)
Dept: NEUROLOGY | Facility: CLINIC | Age: 84
End: 2024-02-13

## 2024-04-02 RX ORDER — MEMANTINE HYDROCHLORIDE 28 MG/1
28 CAPSULE, EXTENDED RELEASE ORAL
Qty: 90 | Refills: 3 | Status: ACTIVE | COMMUNITY
Start: 2023-05-02 | End: 1900-01-01

## 2024-05-19 ENCOUNTER — EMERGENCY (EMERGENCY)
Facility: HOSPITAL | Age: 84
LOS: 1 days | Discharge: HOME | End: 2024-05-19
Attending: STUDENT IN AN ORGANIZED HEALTH CARE EDUCATION/TRAINING PROGRAM
Payer: MEDICARE

## 2024-05-19 VITALS
DIASTOLIC BLOOD PRESSURE: 90 MMHG | HEART RATE: 77 BPM | SYSTOLIC BLOOD PRESSURE: 154 MMHG | RESPIRATION RATE: 17 BRPM | TEMPERATURE: 98 F | OXYGEN SATURATION: 100 %

## 2024-05-19 VITALS
DIASTOLIC BLOOD PRESSURE: 75 MMHG | RESPIRATION RATE: 18 BRPM | WEIGHT: 154.1 LBS | TEMPERATURE: 98 F | OXYGEN SATURATION: 98 % | HEART RATE: 71 BPM | SYSTOLIC BLOOD PRESSURE: 143 MMHG

## 2024-05-19 DIAGNOSIS — Z95.2 PRESENCE OF PROSTHETIC HEART VALVE: Chronic | ICD-10-CM

## 2024-05-19 PROCEDURE — 72125 CT NECK SPINE W/O DYE: CPT | Mod: 26,MC

## 2024-05-19 PROCEDURE — 70450 CT HEAD/BRAIN W/O DYE: CPT | Mod: 26,MC

## 2024-05-19 PROCEDURE — 99284 EMERGENCY DEPT VISIT MOD MDM: CPT | Mod: 25

## 2024-05-19 PROCEDURE — 99284 EMERGENCY DEPT VISIT MOD MDM: CPT

## 2024-05-19 PROCEDURE — 70450 CT HEAD/BRAIN W/O DYE: CPT | Mod: MC

## 2024-05-19 PROCEDURE — 72125 CT NECK SPINE W/O DYE: CPT | Mod: MC

## 2024-05-19 NOTE — ED ADULT NURSE NOTE - NSFALLHARMRISKINTERV_ED_ALL_ED

## 2024-05-19 NOTE — ED PROVIDER NOTE - PHYSICAL EXAMINATION
Const:  patient is in no acute distress, nonverbal,  HEENT: NC/AT. TM's clear bilaterally. Oropharynx clear without exudates or erythema. Moist mucous membranes  Eyes: PERRLA. No scleral icterus. EOMI.  Neck:. Soft and supple. Full ROM without pain. No cervical midline tenderness.   Cardiac: Regular rate and regular rhythm. +S1/S2. Peripheral pulses 2+ and symmetric. No LE edema.  Resp: Speaking in full sentences, breath sounds equal and clear bilaterally. No wheezes, rales or rhonchi.  Abd: Soft, non-tender, non-distended. Normal bowel sounds in all 4 quadrants. No guarding or rebound.  MSK: Spine midline and non-tender. No CVAT.  Skin: small abrasion noted behind the left ear without active bleeding or laceration  Neuro: Moves all extremities symmetrically.  to perform full neuroexam as patient is noncompliant with commands

## 2024-05-19 NOTE — ED PROVIDER NOTE - CLINICAL SUMMARY MEDICAL DECISION MAKING FREE TEXT BOX
83-year-old female patient presents the ED status post fall from nursing home.  Nonverbal at baseline, history of dementia.  In the ED, patient no acute distress, vital signs stable, no obvious sign of head injury.  No other obvious sign of other external trauma.  Patient is not on any blood thinners.  Will obtain CT head noncontrast and CT cervical spine to evaluate for any fractures versus intracranial pathology.  If negative likely safe for discharge back to [facility] 83-year-old female patient presents the ED status post fall from nursing home.  Nonverbal at baseline, history of dementia.  In the ED, patient no acute distress, vital signs stable, no obvious sign of head injury.  No other obvious sign of other external trauma.  Patient is not on any blood thinners.  Will obtain CT head noncontrast and CT cervical spine to evaluate for any fractures versus intracranial pathology.  If negative likely safe for discharge back to [facility]    CT head and neck shows no acute intracranial pathology or cervical fractures.  Patient resting comfortably, vital signs stable.  No other signs of external trauma.  Spoke with the [facility], patient safe for discharge back to nursing [place].  Patient to return to the ED for increasing falls, change in mental status, fevers, or any other acute symptoms or concerns

## 2024-05-19 NOTE — ED ADULT TRIAGE NOTE - CHIEF COMPLAINT QUOTE
BIBEMS from [facility] s/p unwitnessed fall from her bed with +head strike. Pt with PMHx dementia and cognitive impairs, bed bound at baseline is at baseline as per EMS and is not on anticoagulation medications. Laceration noted behind left ear, unknown LOC. Pt denies pain at this time. AAOx1 in triage.

## 2024-05-19 NOTE — ED ADULT NURSE NOTE - CHIEF COMPLAINT QUOTE
BIBEMS from \[facility]\"" s/p unwitnessed fall from her bed with +head strike. Pt with PMHx dementia and cognitive impairs, bed bound at baseline is at baseline as per EMS and is not on anticoagulation medications. Laceration noted behind left ear, unknown LOC. Pt denies pain at this time. AAOx1 in triage.

## 2024-05-19 NOTE — ED ADULT NURSE NOTE - NS PRO AD PATIENT TYPE
Name_______________________________________Printed:____________________  Date and time of surgery___12/6 0945____________________Arrival Time:__0815______________   1. The instructions given regarding when and if a patient needs to stop oral intake prior to surgery varies. Follow the specific instructions you were given                  ___xNothing to eat or to drink after Midnight the night before.                   ____Carbo loading or ERAS instructions will be given to select patients-if you have been given those instructions -please do the following                           The evening before your surgery after dinner before midnight drink 40 ounces of gatorade. If you are diabetic use sugar free. The morning of surgery drink 40 ounces of water. This needs to be finished 3 hours prior to your surgery start time. 2. Take the following pills with a small sip of water on the morning of surgery_ coreg norvasc__________________________________________________                  Do not take blood pressure medications ending in pril or sartan the chin prior to surgery or the morning of surgery_   3. Aspirin, Ibuprofen, Advil, Naproxen, Vitamin E and other Anti-inflammatory products and supplements should be stopped for 5 -7days before surgery or as directed by your physician. 4. Check with your Doctor regarding stopping Plavix, Coumadin,Eliquis, Lovenox,Effient,Pradaxa,Xarelto, Fragmin or other blood thinners and follow their instructions. 5. Do not smoke, and do not drink any alcoholic beverages 24 hours prior to surgery. This includes NA Beer. Refrain from the usage of any recreational drugs. 6. You may brush your teeth and gargle the morning of surgery. DO NOT SWALLOW WATER   7. You MUST make arrangements for a responsible adult to stay on site while you are here and take you home after your surgery. You will not be allowed to leave alone or drive yourself home.   It is strongly suggested someone stay with you the first 24 hrs. Your surgery will be cancelled if you do not have a ride home. 8. A parent/legal guardian must accompany a child scheduled for surgery and plan to stay at the hospital until the child is discharged. Please do not bring other children with you. 9. Please wear simple, loose fitting clothing to the hospital.  Rodney Mates not bring valuables (money, credit cards, checkbooks, etc.) Do not wear any makeup (including no eye makeup) or nail polish on your fingers or toes. 10. DO NOT wear any jewelry or piercings on day of surgery. All body piercing jewelry must be removed. 11. If you have ___dentures, they will be removed before going to the OR; we will provide you a container. If you wear ___contact lenses or ___glasses, they will be removed; please bring a case for them. 12. Please see your family doctor/pediatrician for a history & physical and/or concerning medications. Bring any test results/reports from your physician's office. PCP__________________Phone___________H&P Appt. Date________             13 If you  have a Living Will and Durable Power of  for Healthcare, please bring in a copy. 15. Notify your Surgeon if you develop any illness between now and surgery  time, cough, cold, fever, sore throat, nausea, vomiting, etc.  Please notify your surgeon if you experience dizziness, shortness of breath or blurred vision between now & the time of your surgery             15. DO NOT shave your operative site 96 hours prior to surgery. For face & neck surgery, men may use an electric razor 48 hours prior to surgery. 16. Shower the night before or morning of surgery using an antibacterial soap or as you have been instructed. 17. To provide excellent care visitors will be limited to one in the room at any given time. 18.  Please bring picture ID and insurance card.              19.  Visit our web site for additional information:  [email or web address]              20.During flu season no children under the age of 15 are permitted in the hospital for the safety of all patients. 21. If you take a long acting insulin in the evening only  take half of your usual  dose the night  before your procedure              22. If you use a c-pap please bring DOS if staying overnight,             23.For your convenience [address] [place] [address] has a pharmacy on site to fill your prescriptions. 24. If you use oxygen and have a portable tank please bring it  with you the DOS             25. Bring a complete list of all your medications with name and dose include any supplements. 26. Other__pats 11/30 mff  PCP 11/24  Heart 11/30________________________________________   *Please call pre admission testing if you any further questions   Shearon Res         [phone]   [address]  [phone]   [facility] [address] RACHELL DELACRUZ   [phone]           COVID TESTING    __x_ Covid test to be done 3-5 days prior to scheduled surgery -patient aware they are REQUIRED to bring a copy of the negative result DOS-if they receive a positive result to notify their surgeon         If known - indicate where patient is getting covid test done _11/30 mff with PATs__________________________________________________________    ___ Rapid - DOS    ___ Other___________________________________      Korene Render POLICY(subject to change)    There is a one visitor policy at [facility] for all surgeries and endoscopies. Whether the visitor can stay or will be asked to wait in the car will depend on the current policy and if social distancing can be maintained. The policy is subject to change at any time. Please make sure the visitor has a cell phone that is on,charged and able to accept calls, as this may be the way that the staff communicates with them. Pain management is NO VISITOR policyThe patients ride is expected to remain in the car with a cell phone for communication. If the ride is leaving the hospital grounds please make sure they are back in time for pickup. Have the patient inform the staff on arrival what their rides plans are while the patient is in the facility. At the MAIN there is one visitor allowed. Please note that the visitor policy is subject to change. All above information reviewed with patient in person or by phone. Patient verbalizes understanding. All questions and concerns addressed.                                                                                                  Patient/Rep__per phone/pt________________                                                                                                                                    PRE OP INSTRUCTIONS Power of

## 2024-05-19 NOTE — ED PROVIDER NOTE - ATTENDING CONTRIBUTION TO CARE
Pt with fall out of bed this morning. Pt with advanced dementia and bed bound. pt with small abrasion behind L ear.  Pt at baseline mental status as per ems.  CTs neg. will d/c baack to assisted living.

## 2024-05-19 NOTE — ED PROVIDER NOTE - PATIENT PORTAL LINK FT
You can access the FollowMyHealth Patient Portal offered by [facility] by registering at the following website: [email or web address]. By joining [employer]’s FollowMyHealth portal, you will also be able to view your health information using other applications (apps) compatible with our system.

## 2024-05-19 NOTE — ED PROVIDER NOTE - OBJECTIVE STATEMENT
83-year-old female patient with a history of dementia, nonverbal at baseline presents the ED complaining of fall.  As per EMS, patient experienced a fall at nursing home, hitting the head on the ground.  Brought in to the ED for evaluation.  Patient is not on any blood thinners.  In the ED, patient is unable to provide any further history at this time secondary to dementia

## 2024-05-19 NOTE — ED ADULT NURSE NOTE - IS THE PATIENT ABLE TO BE SCREENED?
TC placed to pt.  Pt states she has been having a lot of vaginal pressure and pain and was hoping to be seen sooner than appt she was scheduled on 05/18/22.   Advised pt that Dr. Walters had a cancellation for tomorrow at 1330.  Pt appreciative, will take appt.  Advised pt on [place] location.  Patient verbalized understanding and denies any questions.     No

## 2024-05-19 NOTE — ED PROVIDER NOTE - NSFOLLOWUPINSTRUCTIONS_ED_ALL_ED_FT
Understanding Your Risk for Falls    Each year, millions of people have serious injuries from falls. It is important to understand your risk for falling. Talk with your health care provider about your risk and what you can do to lower it. There are actions you can take at home to lower your risk.    If you do have a serious fall, make sure you tell your health care provider. Falling once raises your risk for falling again.    How can falls affect me?  Serious injuries from falls are common. These include:    Broken bones, such as hip fractures.  Head injuries, such as traumatic brain injuries (TBI).    Fear of falling can also cause you to avoid activities and stay at home. This can make your muscles weaker and actually raise your risk for a fall.    What can increase my risk?  There are a number of risk factors that increase your risk for falling. The more risk factors you have, the higher your risk for falling. Serious injuries from a fall most often happen to people older than age 65. Children and young adults ages 15–29 are also at higher risk.    Common risk factors include:    Weakness in the lower body.  Lack (deficiency) of vitamin D.  Being generally weak or confused due to long-term (chronic) illness.  Dizziness or balance problems.  Poor vision.  Medicines that cause dizziness or drowsiness. These can include medicines for your blood pressure, heart, anxiety, insomnia, or edema, as well as pain medicines and muscle relaxants.    Other risk factors include:    Drinking alcohol.  Having had a fall in the past.  Having depression.  Foot pain or improper footwear.  Working at a dangerous job.  Having any of the following in your home:    Tripping hazards, such as floor clutter or loose rugs.  Poor lighting.  Pets or clutter.  Dementia or memory loss.    What actions can I take to lower my risk of falling?          Physical activity    Maintain physical fitness. Do strength and balance exercises. Consider taking a regular class to build strength and balance. Yoga and joanne chi are good options.        Vision    Have your eyes checked every year and your vision prescription updated as needed.        Walking aids and footwear    Wear nonskid shoes. Do not wear high heels.  Do not walk around the house in socks or slippers.  Use a cane or walker as told by your health care provider.        Home safety    Attach secure railings on both sides of your stairs.  Install grab bars for your tub, shower, and toilet. Use a bath mat in your tub or shower.  Use good lighting in all rooms. Keep a flashlight near your bed.  Make sure there is a clear path from your bed to the bathroom. Use night-lights.  Do not use throw rugs. Make sure all carpeting is taped or tacked down securely.  Remove all clutter from walkways and stairways, including extension cords.  Repair uneven or broken steps.  Avoid walking on icy or slippery surfaces. Walk on the grass instead of on icy or slick sidewalks. Where you can, use ice melt to get rid of ice on walkways.  Use a cordless phone.    Questions to ask your health care provider  Can you help me check my risk for a fall?  Do any of my medicines make me more likely to fall?  Should I take a vitamin D supplement?  What exercises can I do to improve my strength and balance?  Should I make an appointment to have my vision checked?  Do I need a bone density test to check for weak bones or osteoporosis?  Would it help to use a cane or a walker?    Where to find more information  Centers for Disease Control and PreventionPEGGY: www.cdc.gov  Community-Based Fall Prevention Programs: www.cdc.gov  National [place] on Aging: bk2wefi.divina.nih.gov    Contact a health care provider if:  You fall at home.  You are afraid of falling at home.  You feel weak, drowsy, or dizzy.    Summary  People 65 and older are at high risk for falling. However, older people are not the only ones injured in falls. Children and young adults have a higher-than-normal risk too.  Talk with your health care provider about your risks for falling and how to lower those risks.  Taking certain precautions at home can lower your risk for falling.  If you fall, always tell your health care provider.

## 2024-06-06 ENCOUNTER — EMERGENCY (EMERGENCY)
Facility: HOSPITAL | Age: 84
LOS: 1 days | Discharge: HOME | End: 2024-06-06
Attending: STUDENT IN AN ORGANIZED HEALTH CARE EDUCATION/TRAINING PROGRAM
Payer: MEDICARE

## 2024-06-06 VITALS
RESPIRATION RATE: 16 BRPM | WEIGHT: 104.94 LBS | OXYGEN SATURATION: 95 % | DIASTOLIC BLOOD PRESSURE: 66 MMHG | HEART RATE: 67 BPM | SYSTOLIC BLOOD PRESSURE: 103 MMHG | TEMPERATURE: 98 F | HEIGHT: 65 IN

## 2024-06-06 DIAGNOSIS — Z95.2 PRESENCE OF PROSTHETIC HEART VALVE: Chronic | ICD-10-CM

## 2024-06-06 PROCEDURE — 70486 CT MAXILLOFACIAL W/O DYE: CPT | Mod: 26,MC

## 2024-06-06 PROCEDURE — 70486 CT MAXILLOFACIAL W/O DYE: CPT | Mod: MC

## 2024-06-06 PROCEDURE — 72125 CT NECK SPINE W/O DYE: CPT | Mod: MC

## 2024-06-06 PROCEDURE — 72170 X-RAY EXAM OF PELVIS: CPT | Mod: 26

## 2024-06-06 PROCEDURE — 99285 EMERGENCY DEPT VISIT HI MDM: CPT | Mod: GC

## 2024-06-06 PROCEDURE — 99284 EMERGENCY DEPT VISIT MOD MDM: CPT | Mod: 25

## 2024-06-06 PROCEDURE — 70450 CT HEAD/BRAIN W/O DYE: CPT | Mod: 26,MC

## 2024-06-06 PROCEDURE — 70450 CT HEAD/BRAIN W/O DYE: CPT | Mod: MC

## 2024-06-06 PROCEDURE — 72170 X-RAY EXAM OF PELVIS: CPT

## 2024-06-06 PROCEDURE — 71045 X-RAY EXAM CHEST 1 VIEW: CPT | Mod: 26

## 2024-06-06 PROCEDURE — 72125 CT NECK SPINE W/O DYE: CPT | Mod: 26,MC

## 2024-06-06 PROCEDURE — 71045 X-RAY EXAM CHEST 1 VIEW: CPT

## 2024-06-06 NOTE — ED ADULT NURSE NOTE - NSFALLRISKINTERV_ED_ALL_ED

## 2024-06-06 NOTE — ED PROVIDER NOTE - ATTENDING CONTRIBUTION TO CARE
I have personally performed a history and physical examination of the patient and discussed management with the resident as well as the patient.  I reviewed the resident's note and agree with the documented findings and plan of care.  I have authored and modified critical sections of the Provider Note, including but not limited to HPI, Physical Exam and MDM.    HPI: 83-year-old female past medical history of dementia presents status post fall.  As per EMS providing collateral remains at bedside patient was reported to have fallen out of her wheelchair.  Fall was unwitnessed and patient found on ground per nursing home staff.  No known AC.  No other complaints this time.    ROS:   UTO 2/2 dementia    PE:  General: NAD; well appearing; A&O x self  Head: Frontal hematoma, blood from bilateral naris, no evidence of septal hematoma, no maxillofacial dislocation.  Eyes: PERRL, EOMI  ENT: Airway patent, mmm  Pulmonary: CTA b/l, symmetric breath sounds. No W/R/R.  Cardiac: s1s2, RRR, no M,G,R, No JVD  Abdomen: +BS, ND, NT, soft, no guarding, no rebound, no masses , no rigidity  : No CVA TTP, no suprapubic TTP  Back: Normal  spine  Extremities: Limited examination due to patient compliance however appreciable FROM, symmetric pulses, capillary refill < 2 seconds, no edema, 5/5 strength in b/l UE and LE, pelvis stable to pelvic rock  Skin: no rash or bruising    MDM: 83-year-old female past medical history of dementia presents status post fall.  As per EMS providing collateral remains at bedside patient was reported to have fallen out of her wheelchair.  Fall was unwitnessed and patient found on ground per nursing home staff.  No known AC.  Will obtain CT head, CT C-spine, CT max face, chest x-ray, pelvis x-ray for possible TBI versus fracture.  Disposition pending results

## 2024-06-06 NOTE — ED PROVIDER NOTE - NSFOLLOWUPINSTRUCTIONS_ED_ALL_ED_FT
Follow up with the ENT provided.    Fall Prevention in Hospitals, Adult  Staying in the hospital puts you at risk of falling. Falls can cause serious injuries, but they can be prevented.    Make sure you know what puts you at risk for falling and what you and your health care team can do to prevent falls. If you or a loved one falls in the hospital, tell the hospital staff about it.    What can increase my risk of falls?  Factors that increase your risk of falling in the hospital include:  Being in an unfamiliar environment, especially when using the bathroom at night.  Having surgery or being on bed rest.  Taking many medicines or certain types of medicines, such as sleeping pills. Some medicines can cause confusion, trouble with balance, dizziness, or low blood pressure.  Having tubes in place, such as IVs or catheters.  Other risk factors for falls while in the hospital include:  Having trouble with hearing or vision.  Having depression.  Needing to use the toilet frequently.  Having fallen during the past 3 months.  What actions can I take to prevent falls?  A person with an IV in the arm holding a call button.  If you or a loved one has to stay in the hospital:  Ask about which fall prevention strategies will be in place.  Do not get up by yourself if you have been asked to call for help when getting up. Asking for help to get up is for your safety, and the staff is there to help you.  Wear non-skid shoes or non-skid slippers.  Get up slowly, and sit at the side of the bed for a few minutes before standing up.  Keep items you need close to you, such as the call button or a phone, so that you do not need to reach for them.  Wear eyeglasses or hearing aids as told by your health care provider.  Have someone stay in the hospital with you or your loved one.  Ask if sleeping pills or other medicines that can cause confusion or dizziness are necessary if they are prescribed to you or a loved one.  What does the hospital staff do to help prevent falls?  A hospital bed with side rails.  A health care provider helping a person who is wearing a safety belt.  Hospitals have systems in place to prevent falls and accidents, which may include:  Discussing your fall risk and making a personalized fall prevention plan.  Checking in regularly to see if you need help. Some hospitals use video monitoring that allows a staff member to come to you if you need help.  Placing an armband on your wrist or a sign near your room to alert other staff of your needs.  Using an alarm on your hospital bed. This is an alarm that goes off if you get out of bed and forget to call for help.  Keeping the bed in a low and locked position.  Keeping the area around the bed and bathroom well-lit and not cluttered.  Having a staff person stay with you (one-on-one observation), even when you are using the bathroom. This is for your safety.  Using safety equipment, such as:  A belt around your waist.  Walkers, crutches, and other devices for support.  Safety beds, such as low beds, or cushions on the floor next to the bed.  What other actions can I take to prevent falls?  Check in regularly with your provider or pharmacist to review all medicines that you take.  Make sure that you have a regular exercise program to stay physically fit. This will help you maintain your balance.  Talk with a physical therapist if recommended by your provider. A physical therapist can help you learn to do exercises to improve movement and strength.  If you are over 65 years old:  Ask your provider if you need a calcium or vitamin D supplement.  Have your eyes and hearing checked every year.  Have your feet checked every year.  This information is not intended to replace advice given to you by your health care provider. Make sure you discuss any questions you have with your health care provider.

## 2024-06-06 NOTE — ED ADULT NURSE REASSESSMENT NOTE - NS ED NURSE REASSESS COMMENT FT1
Assumed care of pt at 19:15 as stated in report from day RN. Charting as noted. Patient denies any pain/discomfort, denies CP/SOB. Updated on the plan of care. Call bell within reach, bed locked in lowest position. No signs of acute distress noted, safety maintained. Pt pending CT.

## 2024-06-06 NOTE — ED PROVIDER NOTE - PROGRESS NOTE DETAILS
Harshad: Pt received in signout from Dr. Vickers. CTs reviewed. Ambulance arranged for transport back to facility.

## 2024-06-06 NOTE — ED PROVIDER NOTE - PATIENT PORTAL LINK FT
You can access the FollowMyHealth Patient Portal offered by [facility] by registering at the following website: [email or web address]. By joining [employer]’s FollowMyHealth portal, you will also be able to view your health information using other applications (apps) compatible with our system.

## 2024-06-06 NOTE — ED PROVIDER NOTE - CARE PROVIDER_API CALL
Christos Gregorio  Head/Neck Surgery  [address] [place] [address], [address]  [place] [address]  Phone: [phone]  Fax: [phone]  Follow Up Time:

## 2024-06-06 NOTE — ED ADULT TRIAGE NOTE - CHIEF COMPLAINT QUOTE
Pt BIBA from NH for unwitnessed fall out of her wheelchair. Pt has dementia and is at baseline mental status as per staff. No thinners. Has hematoma to forehead and had a bloody nose PTA

## 2024-06-06 NOTE — ED ADULT NURSE NOTE - OBJECTIVE STATEMENT
82 YO female  brought in by ambulance from nursing home for unwitnessed fall out of her wheel chair. Patient is awake. Pt has dementia and is at baseline mental status as per staff. No thinners. Has hematoma to forehead and had bleeding from nose, controlled, dried blood seen around nose. Patient placed on a cervical collar. Awaiting Ct scan.

## 2024-06-07 NOTE — ED POST DISCHARGE NOTE - DETAILS
Spoke with son regarding finding, will consider f/u PCP/CT chest. He was also unaware of CT findings so we reviewed those results as well which noted nasal fracture and that was given info for ENT follow up

## 2024-08-31 NOTE — DIETITIAN INITIAL EVALUATION ADULT - BUCCAL DEPLETION IS
"[facility] Clinical Pharmacy Services: Vancomycin Monitoring Note    Caitlyn Longoria is a 89 y.o. female who is on day 5 of pharmacy to dose vancomycin for Skin and Soft Tissue.    Previous Vancomycin Dose:    1000 mg IV every  24  hours  Updated Cultures and Sensitivities:   8/26 Bcx NGTD  Results from last 7 days   Lab Units 08/29/24  2139 08/27/24  0540   VANCOMYCIN RM mcg/mL  --  25.60   VANCOMYCIN TR mcg/mL 10.80  --      Vitals/Labs  Ht: 160 cm (62.99\"); Wt: 84 kg (185 lb 3 oz)   Temp Readings from Last 1 Encounters:   08/31/24 97.9 °F (36.6 °C) (Oral)     Estimated Creatinine Clearance: 55.1 mL/min (by C-G formula based on SCr of 0.71 mg/dL).        Results from last 7 days   Lab Units 08/31/24  0530 08/31/24  0529 08/30/24  0723 08/29/24  0303   CREATININE mg/dL 0.71  --  0.74 0.79   WBC 10*3/mm3  --  18.21* 21.30* 16.17*     Assessment/Plan    Current Vancomycin Dose: 1250 mg IV every  24  hours; provides a predicted  mg/L.hr   Next Level Date and Time: No levels currently pending. Will follow and order as appropriate.  We will continue to monitor patient changes and renal function     Thank you for involving pharmacy in this patient's care. Please contact pharmacy with any questions or concerns.       Bean Franklin, [facility]  Clinical Pharmacist          " mild

## 2024-12-18 NOTE — PATIENT PROFILE ADULT - FUNCTIONAL ASSESSMENT - BASIC MOBILITY 4.
No respiratory distress. No stridor, Lungs sounds clear with good aeration bilaterally.
3 = A little assistance

## 2025-01-22 ENCOUNTER — RX RENEWAL (OUTPATIENT)
Age: 85
End: 2025-01-22